# Patient Record
Sex: FEMALE | Race: WHITE | NOT HISPANIC OR LATINO | Employment: OTHER | URBAN - METROPOLITAN AREA
[De-identification: names, ages, dates, MRNs, and addresses within clinical notes are randomized per-mention and may not be internally consistent; named-entity substitution may affect disease eponyms.]

---

## 2017-10-06 ENCOUNTER — TRANSCRIBE ORDERS (OUTPATIENT)
Dept: ADMINISTRATIVE | Facility: HOSPITAL | Age: 62
End: 2017-10-06

## 2017-10-06 DIAGNOSIS — Z12.39 SCREENING BREAST EXAMINATION: Primary | ICD-10-CM

## 2017-10-12 ENCOUNTER — TRANSCRIBE ORDERS (OUTPATIENT)
Dept: ADMINISTRATIVE | Facility: HOSPITAL | Age: 62
End: 2017-10-12

## 2017-10-12 DIAGNOSIS — IMO0001 THE CHANGE: Primary | ICD-10-CM

## 2017-10-18 ENCOUNTER — APPOINTMENT (OUTPATIENT)
Dept: BONE DENSITY | Facility: HOSPITAL | Age: 62
End: 2017-10-18

## 2017-10-18 ENCOUNTER — HOSPITAL ENCOUNTER (OUTPATIENT)
Dept: MAMMOGRAPHY | Facility: HOSPITAL | Age: 62
Discharge: HOME | End: 2017-10-18
Admitting: FAMILY MEDICINE

## 2017-10-18 DIAGNOSIS — IMO0001 THE CHANGE: ICD-10-CM

## 2017-10-18 DIAGNOSIS — Z12.39 SCREENING BREAST EXAMINATION: ICD-10-CM

## 2017-10-18 PROCEDURE — 77063 BREAST TOMOSYNTHESIS BI: CPT

## 2017-10-18 PROCEDURE — G0202 SCR MAMMO BI INCL CAD: HCPCS

## 2017-10-18 PROCEDURE — 77080 DXA BONE DENSITY AXIAL: CPT

## 2018-05-10 ENCOUNTER — ANESTHESIA EVENT (OUTPATIENT)
Dept: PERIOP | Facility: HOSPITAL | Age: 63
End: 2018-05-10

## 2018-05-10 RX ORDER — HYDROCODONE BITARTRATE AND ACETAMINOPHEN 10; 325 MG/1; MG/1
1 TABLET ORAL EVERY 6 HOURS PRN
COMMUNITY

## 2018-05-10 RX ORDER — LEVOTHYROXINE SODIUM 112 UG/1
112 TABLET ORAL DAILY
COMMUNITY

## 2018-05-10 RX ORDER — CYCLOBENZAPRINE HCL 10 MG
10 TABLET ORAL 3 TIMES DAILY PRN
COMMUNITY
End: 2021-07-07

## 2018-05-10 RX ORDER — PSEUDOEPHEDRINE HCL 30 MG
30 TABLET ORAL DAILY
COMMUNITY

## 2018-05-10 RX ORDER — MELOXICAM 15 MG/1
15 TABLET ORAL DAILY
COMMUNITY
End: 2021-07-07

## 2018-05-10 RX ORDER — GABAPENTIN 600 MG/1
600 TABLET ORAL 2 TIMES DAILY
COMMUNITY

## 2018-05-11 ENCOUNTER — HOSPITAL ENCOUNTER (OUTPATIENT)
Facility: HOSPITAL | Age: 63
Setting detail: HOSPITAL OUTPATIENT SURGERY
Discharge: HOME | End: 2018-05-11
Attending: INTERNAL MEDICINE | Admitting: INTERNAL MEDICINE

## 2018-05-11 ENCOUNTER — ON CAMPUS - OUTPATIENT (OUTPATIENT)
Dept: URBAN - METROPOLITAN AREA HOSPITAL 28 | Facility: HOSPITAL | Age: 63
End: 2018-05-11
Payer: MEDICARE

## 2018-05-11 ENCOUNTER — PREP FOR SURGERY (OUTPATIENT)
Dept: OTHER | Facility: HOSPITAL | Age: 63
End: 2018-05-11

## 2018-05-11 ENCOUNTER — ANESTHESIA (OUTPATIENT)
Dept: PERIOP | Facility: HOSPITAL | Age: 63
End: 2018-05-11

## 2018-05-11 VITALS
WEIGHT: 149.8 LBS | BODY MASS INDEX: 24.08 KG/M2 | HEART RATE: 75 BPM | TEMPERATURE: 98.1 F | DIASTOLIC BLOOD PRESSURE: 72 MMHG | OXYGEN SATURATION: 97 % | HEIGHT: 66 IN | RESPIRATION RATE: 16 BRPM | SYSTOLIC BLOOD PRESSURE: 112 MMHG

## 2018-05-11 DIAGNOSIS — K63.89 OTHER SPECIFIED DISEASES OF INTESTINE: ICD-10-CM

## 2018-05-11 DIAGNOSIS — K57.30 DIVERTICULOSIS OF LARGE INTESTINE WITHOUT PERFORATION OR ABS: ICD-10-CM

## 2018-05-11 DIAGNOSIS — Z12.11 ENCOUNTER FOR SCREENING FOR MALIGNANT NEOPLASM OF COLON: ICD-10-CM

## 2018-05-11 DIAGNOSIS — D12.2 BENIGN NEOPLASM OF ASCENDING COLON: ICD-10-CM

## 2018-05-11 DIAGNOSIS — D12.5 BENIGN NEOPLASM OF SIGMOID COLON: ICD-10-CM

## 2018-05-11 DIAGNOSIS — Z12.11 SCREENING FOR COLON CANCER: ICD-10-CM

## 2018-05-11 DIAGNOSIS — D12.3 BENIGN NEOPLASM OF TRANSVERSE COLON: ICD-10-CM

## 2018-05-11 PROCEDURE — 25010000002 PROPOFOL 10 MG/ML EMULSION: Performed by: NURSE ANESTHETIST, CERTIFIED REGISTERED

## 2018-05-11 PROCEDURE — 45380 COLONOSCOPY AND BIOPSY: CPT | Mod: PT

## 2018-05-11 RX ORDER — SODIUM CHLORIDE, SODIUM LACTATE, POTASSIUM CHLORIDE, CALCIUM CHLORIDE 600; 310; 30; 20 MG/100ML; MG/100ML; MG/100ML; MG/100ML
9 INJECTION, SOLUTION INTRAVENOUS CONTINUOUS
Status: DISCONTINUED | OUTPATIENT
Start: 2018-05-11 | End: 2018-05-11 | Stop reason: HOSPADM

## 2018-05-11 RX ORDER — MAGNESIUM HYDROXIDE 1200 MG/15ML
LIQUID ORAL AS NEEDED
Status: DISCONTINUED | OUTPATIENT
Start: 2018-05-11 | End: 2018-05-11 | Stop reason: HOSPADM

## 2018-05-11 RX ORDER — LIDOCAINE HYDROCHLORIDE 10 MG/ML
0.5 INJECTION, SOLUTION EPIDURAL; INFILTRATION; INTRACAUDAL; PERINEURAL ONCE AS NEEDED
Status: COMPLETED | OUTPATIENT
Start: 2018-05-11 | End: 2018-05-11

## 2018-05-11 RX ORDER — SODIUM CHLORIDE 9 MG/ML
40 INJECTION, SOLUTION INTRAVENOUS AS NEEDED
Status: DISCONTINUED | OUTPATIENT
Start: 2018-05-11 | End: 2018-05-11 | Stop reason: HOSPADM

## 2018-05-11 RX ORDER — SODIUM CHLORIDE 0.9 % (FLUSH) 0.9 %
1-10 SYRINGE (ML) INJECTION AS NEEDED
Status: DISCONTINUED | OUTPATIENT
Start: 2018-05-11 | End: 2018-05-11 | Stop reason: HOSPADM

## 2018-05-11 RX ORDER — PROPOFOL 10 MG/ML
VIAL (ML) INTRAVENOUS AS NEEDED
Status: DISCONTINUED | OUTPATIENT
Start: 2018-05-11 | End: 2018-05-11 | Stop reason: SURG

## 2018-05-11 RX ADMIN — PROPOFOL 50 MG: 10 INJECTION, EMULSION INTRAVENOUS at 13:46

## 2018-05-11 RX ADMIN — PROPOFOL 100 MG: 10 INJECTION, EMULSION INTRAVENOUS at 13:30

## 2018-05-11 RX ADMIN — PROPOFOL 20 MG: 10 INJECTION, EMULSION INTRAVENOUS at 13:58

## 2018-05-11 RX ADMIN — PROPOFOL 50 MG: 10 INJECTION, EMULSION INTRAVENOUS at 13:42

## 2018-05-11 RX ADMIN — EPHEDRINE SULFATE 5 MG: 50 INJECTION INTRAMUSCULAR; INTRAVENOUS; SUBCUTANEOUS at 14:02

## 2018-05-11 RX ADMIN — SODIUM CHLORIDE, POTASSIUM CHLORIDE, SODIUM LACTATE AND CALCIUM CHLORIDE 9 ML/HR: 600; 310; 30; 20 INJECTION, SOLUTION INTRAVENOUS at 12:58

## 2018-05-11 RX ADMIN — PROPOFOL 50 MG: 10 INJECTION, EMULSION INTRAVENOUS at 13:38

## 2018-05-11 RX ADMIN — EPHEDRINE SULFATE 5 MG: 50 INJECTION INTRAMUSCULAR; INTRAVENOUS; SUBCUTANEOUS at 13:59

## 2018-05-11 RX ADMIN — LIDOCAINE HYDROCHLORIDE 0.5 ML: 10 INJECTION, SOLUTION EPIDURAL; INFILTRATION; INTRACAUDAL; PERINEURAL at 12:58

## 2018-05-11 RX ADMIN — PROPOFOL 50 MG: 10 INJECTION, EMULSION INTRAVENOUS at 13:54

## 2018-05-11 RX ADMIN — PROPOFOL 50 MG: 10 INJECTION, EMULSION INTRAVENOUS at 13:34

## 2018-05-11 RX ADMIN — PROPOFOL 50 MG: 10 INJECTION, EMULSION INTRAVENOUS at 13:50

## 2018-05-11 NOTE — H&P
Patient Care Team:  Paula Vazquez MD as PCP - General (Family Medicine)    CHIEF COMPLAINT: Screening for CRC    HISTORY OF PRESENT ILLNESS:    Last exam was >10 yr      Past Medical History:   Diagnosis Date   • Chronic back pain    • Disease of thyroid gland    • Hypercholesterolemia    • PONV (postoperative nausea and vomiting)    • Seasonal allergies      Past Surgical History:   Procedure Laterality Date   • APPENDECTOMY     • CERVICAL FUSION  1998    C3   • NECK SURGERY     • RECONSTRUCTION OF NOSE     • SKIN GRAFT       History reviewed. No pertinent family history.  Social History   Substance Use Topics   • Smoking status: Current Every Day Smoker     Packs/day: 1.00     Types: Cigarettes   • Smokeless tobacco: Never Used   • Alcohol use No     Prescriptions Prior to Admission   Medication Sig Dispense Refill Last Dose   • cyclobenzaprine (FLEXERIL) 10 MG tablet Take 10 mg by mouth 3 (Three) Times a Day As Needed for Muscle Spasms.   5/7/2018   • Docusate Calcium (STOOL SOFTENER PO) Take  by mouth Daily.   5/9/2018   • DULoxetine HCl (CYMBALTA PO) Take  by mouth Daily.   5/11/2018   • gabapentin (NEURONTIN) 600 MG tablet Take 600 mg by mouth 2 (Two) Times a Day.   5/11/2018   • HYDROcodone-acetaminophen (NORCO)  MG per tablet Take 1 tablet by mouth Every 6 (Six) Hours As Needed for Moderate Pain .   5/11/2018 at 0530   • levothyroxine (SYNTHROID, LEVOTHROID) 112 MCG tablet Take 112 mcg by mouth Daily.   5/10/2018   • meloxicam (MOBIC) 15 MG tablet Take 15 mg by mouth Daily.   5/6/2018   • pseudoephedrine (SUDAFED) 30 MG tablet Take 30 mg by mouth Daily.   5/8/2018   • Psyllium (METAMUCIL FIBER PO) Take  by mouth Daily.   5/9/2018   • SIMVASTATIN PO Take 1 mg by mouth Daily.   5/10/2018   • TRAZODONE HCL PO Take 1 tablet by mouth Every Night.   5/4/2018     Allergies:  Ampicillin    REVIEW OF SYSTEMS:  Please see the above history of present illness for pertinent positives and negatives.  The remainder  "of the patient's systems have been reviewed and are negative.     Vital Signs  Temp:  [98.6 °F (37 °C)] 98.6 °F (37 °C)  Heart Rate:  [67] 67  Resp:  [12] 12  BP: (124)/(81) 124/81    Flowsheet Rows    Flowsheet Row First Filed Value   Admission Height 167.6 cm (66\") Documented at 05/11/2018 1226   Admission Weight 67.9 kg (149 lb 12.8 oz) Documented at 05/11/2018 1226           Physical Exam:  Physical Exam   Constitutional: Patient appears well-developed and well-nourished and in no acute distress   HEENT:   Head: Normocephalic and atraumatic.   Eyes:  Pupils are equal, round, and reactive to light. EOM are intact. Sclera are anicteric and non-injected.  Mouth and Throat: Patient has moist mucous membranes. Oropharynx is clear of any erythema or exudate.     Neck: Neck supple. No JVD present. No thyromegaly present. No lymphadenopathy present.  Cardiovascular: Regular rate, regular rhythm, S1 normal and S2 normal.  Exam reveals no gallop and no friction rub.  No murmur heard.  Pulmonary/Chest: Lungs are clear to auscultation bilaterally. No respiratory distress. No wheezes. No rhonchi. No rales.   Abdominal: Soft. Bowel sounds are normal. No distension and no mass. There is no hepatosplenomegaly. There is no tenderness.   Musculoskeletal: Normal Muscle tone  Extremities: No edema. Pulses are palpable in all 4 extremities.  Neurological: Patient is alert and oriented to person, place, and time. Cranial nerves II-XII are grossly intact with no focal deficits.  Skin: Skin is warm. No rash noted. Nails show no clubbing.  No cyanosis or erythema.     Results Review:    I reviewed the patient's new clinical results.  Lab Results (most recent)     None          Imaging Results (most recent)     None        reviewed    ECG/EMG Results (most recent)     None        reviewed    Assessment/Plan     Screening for CRC/ Colonoscopy    I discussed the patients findings and my recommendations with patientDarian Boyd " MD Kathia  05/11/18  1:34 PM    Time: 10 min prior to procedure.

## 2018-05-11 NOTE — ANESTHESIA POSTPROCEDURE EVALUATION
Patient: Rosita Houston    Procedure Summary     Date:  05/11/18 Room / Location:  [facility] ENDOSCOPY 1 /  [facility]    Anesthesia Start:  1318 Anesthesia Stop:  1404    Procedure:  COLONOSCOPY (N/A ) Diagnosis:       Colon polyps      Diverticulosis      Melanosis coli      (Z12.11)    Surgeon:  Richard Bae MD Provider:  Savannah Esposito CRNA    Anesthesia Type:  MAC ASA Status:  2          Anesthesia Type: MAC  Last vitals  BP   97/67 (05/11/18 1410)   Temp   98.1 °F (36.7 °C) (05/11/18 1406)   Pulse   68 (05/11/18 1410)   Resp   12 (05/11/18 1410)     SpO2   98 % (05/11/18 1410)     Post Anesthesia Care and Evaluation    Patient location during evaluation: bedside  Patient participation: complete - patient participated  Level of consciousness: awake  Pain score: 0  Airway patency: patent  Anesthetic complications: No anesthetic complications  PONV Status: none  Cardiovascular status: acceptable  Respiratory status: acceptable  Hydration status: acceptable

## 2018-05-11 NOTE — OP NOTE
COLONOSCOPY  Procedure Report    Patient Name:  Rosita Houston  YOB: 1955    Date of Surgery:  5/11/2018     Indications:  Screening for CRC    Pre-op Diagnosis:   Z12.11    Post-Op Diagnosis Codes:     * Colon polyps [K63.5]     * Diverticulosis [K57.90]     * Melanosis coli [K63.89]         Procedure/CPT® Codes:      Procedure(s):  COLONOSCOPY    Staff:  Surgeon(s):  Richard Bae MD         Anesthesia: Monitor Anesthesia Care    Estimated Blood Loss: none    Specimens:   ID Type Source Tests Collected by Time   A : ASCENDING COLON POLYPS X2 Polyp Large Intestine, Right / Ascending Colon TISSUE PATHOLOGY EXAM Richard Bae MD 5/11/2018 1346   B : TRANSVERSE COLON POLYP Polyp Large Intestine, Transverse Colon TISSUE PATHOLOGY EXAM Richard Bae MD 5/11/2018 1352   C : SIGMOID POLYPS X2 Polyp Large Intestine, Sigmoid Colon TISSUE PATHOLOGY EXAM Richard Bae MD 5/11/2018 1356       Implants:    Nothing was implanted during the procedure      Description of Procedure: DESCRIPTION OF PROCEDURE: After having signed informed consent she was brought to the endoscopy suite, placed in the left lateral decubitus position, and given her IV sedation. Rectal exam revealed swollen anal canal but normal tone and no rectal mass. The scope was introduced in the rectum and advanced under direct visualization past mild melanosis coli and small diverticulum in the sigmoid colon. The scope was advanced through the descending colon, to and around the splenic flexure, reduced and advanced through the transverse colon with some looping. The scope was reduced using abdominal splinting. The scope was advanced to and around the hepatic flexure through the ascending colon into the cecum. The cecum was identified by the appendiceal orifice and the ileocecal valve. It was well-prepped and normal-appearing. The ileocecal valve was identified but not significantly intubated. As the  scope was withdrawn there were 2 ascending colon polyps that ranged from 5 to 7 mm; they were both biopsied, felt to be completely removed, and sent together as ascending colon polyps x2. The scope was withdrawn around the hepatic flexure through the transverse colon. There was a sessile 5 mm polyp noted in the transverse colon. This was biopsied and sent separately. The scope was withdrawn around the splenic flexure through the descending colon and sigmoid colon. Within the sigmoid colon there were 2 sessile polyps noted; they ranged in size from 4 to 6 mm and both removed by cold biopsy forceps and sent together as sigmoid colon polyps x2. The scope was withdrawn in the rectum and retroflexed, revealing an intact dentate line and no mucosal lesions. The scope was de-retroflexed and withdrawn. The patient tolerated the procedure very well.          Findings:Colon to Cecum Good Prep  Mild Melanosis  Diverticulosis  Polyps (5)  Cold Biopsy       Complications: none    Recommendations: Repeat in 3 years, results to be called      Richard Bae MD     Date: 5/11/2018  Time: 2:14 PM

## 2018-05-11 NOTE — BRIEF OP NOTE
COLONOSCOPY  Progress Note    Rosita Houston  5/11/2018    Pre-op Diagnosis:   Z12.11       Post-Op Diagnosis Codes:     * Colon polyps [K63.5]     * Diverticulosis [K57.90]     * Melanosis coli [K63.89]    Procedure/CPT® Codes:      Procedure(s):  COLONOSCOPY    Surgeon(s):  Richard Bae MD    Anesthesia: Monitor Anesthesia Care    Staff:   Circulator: Katie Wang RN  Scrub Person: Rach Jones    Estimated Blood Loss: none    Urine Voided: * No values recorded between 5/11/2018  1:20 PM and 5/11/2018  2:05 PM *    Specimens:                ID Type Source Tests Collected by Time   A : ASCENDING COLON POLYPS X2 Polyp Large Intestine, Right / Ascending Colon TISSUE PATHOLOGY EXAM Richard Bae MD 5/11/2018 1346   B : TRANSVERSE COLON POLYP Polyp Large Intestine, Transverse Colon TISSUE PATHOLOGY EXAM Richard Bae MD 5/11/2018 1352   C : SIGMOID POLYPS X2 Polyp Large Intestine, Sigmoid Colon TISSUE PATHOLOGY EXAM Richard Bae MD 5/11/2018 1356         Drains:      Findings: Colon to Cecum Good Prep  Mild Melanosis  Diverticulosis  Polyps (5)  Cold Biopsy    Complications: none      Richard Bae MD     Date: 5/11/2018  Time: 2:09 PM

## 2018-05-11 NOTE — ANESTHESIA PREPROCEDURE EVALUATION
Anesthesia Evaluation     Patient summary reviewed and Nursing notes reviewed   history of anesthetic complications (as a child): PONV  NPO Solid Status: > 8 hours  NPO Liquid Status: > 4 hours           Airway   Mallampati: IV  TM distance: >3 FB  Neck ROM: limited  Possible difficult intubation  Comment: UNABLE TO PROTUDE TONGUE DUE TO FRENULUM  Dental    (+) upper dentures    Pulmonary - normal exam   (+) a smoker (1 PPD/40 YRS) Current Smoked day of surgery, rhonchi (SCATTERED RHONCHI ON RT/ CLEARS W COUGH),   Cardiovascular - normal exam    (+) hyperlipidemia,       Neuro/Psych  (+) psychiatric history Anxiety and Depression,     GI/Hepatic/Renal/Endo    (+)   hypothyroidism,     Musculoskeletal     (+) back pain (LUMBAR),   Abdominal    Substance History   (+) alcohol use (RARE),   Drug use: MJ UNTIL 3 MOS AGO.     OB/GYN          Other                      Anesthesia Plan    ASA 2     MAC     intravenous induction   Anesthetic plan and risks discussed with patient.  Use of blood products discussed with patient  Consented to blood products.

## 2018-05-16 LAB
LAB AP CASE REPORT: NORMAL
LAB AP CLINICAL INFORMATION: NORMAL
Lab: NORMAL
PATH REPORT.FINAL DX SPEC: NORMAL

## 2019-01-21 ENCOUNTER — TRANSCRIBE ORDERS (OUTPATIENT)
Dept: ADMINISTRATIVE | Facility: HOSPITAL | Age: 64
End: 2019-01-21

## 2019-01-21 DIAGNOSIS — R10.9 ABDOMINAL PAIN, UNSPECIFIED ABDOMINAL LOCATION: Primary | ICD-10-CM

## 2019-01-25 ENCOUNTER — HOSPITAL ENCOUNTER (OUTPATIENT)
Dept: CT IMAGING | Facility: HOSPITAL | Age: 64
End: 2019-01-25

## 2019-01-25 ENCOUNTER — HOSPITAL ENCOUNTER (OUTPATIENT)
Dept: CT IMAGING | Facility: HOSPITAL | Age: 64
Discharge: HOME | End: 2019-01-25
Admitting: FAMILY MEDICINE

## 2019-01-25 DIAGNOSIS — R10.9 ABDOMINAL PAIN, UNSPECIFIED ABDOMINAL LOCATION: ICD-10-CM

## 2019-01-25 PROCEDURE — 74177 CT ABD & PELVIS W/CONTRAST: CPT

## 2019-01-25 PROCEDURE — 0 DIATRIZOATE MEGLUMINE & SODIUM PER 1 ML: Performed by: FAMILY MEDICINE

## 2019-01-25 PROCEDURE — 0 IOPAMIDOL PER 1 ML: Performed by: FAMILY MEDICINE

## 2019-01-25 RX ADMIN — DIATRIZOATE MEGLUMINE AND DIATRIZOATE SODIUM 30 ML: 600; 100 SOLUTION ORAL; RECTAL at 09:00

## 2019-01-25 RX ADMIN — IOPAMIDOL 100 ML: 755 INJECTION, SOLUTION INTRAVENOUS at 10:23

## 2019-03-27 ENCOUNTER — TRANSCRIBE ORDERS (OUTPATIENT)
Dept: ADMINISTRATIVE | Facility: HOSPITAL | Age: 64
End: 2019-03-27

## 2019-03-27 DIAGNOSIS — Z12.39 SCREENING BREAST EXAMINATION: Primary | ICD-10-CM

## 2019-04-05 ENCOUNTER — HOSPITAL ENCOUNTER (OUTPATIENT)
Dept: MAMMOGRAPHY | Facility: HOSPITAL | Age: 64
Discharge: HOME | End: 2019-04-05
Admitting: FAMILY MEDICINE

## 2019-04-05 DIAGNOSIS — Z12.39 SCREENING BREAST EXAMINATION: ICD-10-CM

## 2019-04-05 PROCEDURE — 77067 SCR MAMMO BI INCL CAD: CPT

## 2019-04-05 PROCEDURE — 77063 BREAST TOMOSYNTHESIS BI: CPT

## 2020-08-10 ENCOUNTER — TRANSCRIBE ORDERS (OUTPATIENT)
Dept: ADMINISTRATIVE | Facility: HOSPITAL | Age: 65
End: 2020-08-10

## 2020-08-10 DIAGNOSIS — R91.1 PULMONARY NODULE: Primary | ICD-10-CM

## 2020-08-11 ENCOUNTER — HOSPITAL ENCOUNTER (OUTPATIENT)
Dept: CT IMAGING | Facility: HOSPITAL | Age: 65
Discharge: HOME | End: 2020-08-11
Admitting: NURSE PRACTITIONER

## 2020-08-11 DIAGNOSIS — R91.1 PULMONARY NODULE: ICD-10-CM

## 2020-08-11 PROCEDURE — 71250 CT THORAX DX C-: CPT

## 2020-09-15 ENCOUNTER — TRANSCRIBE ORDERS (OUTPATIENT)
Dept: ADMINISTRATIVE | Facility: HOSPITAL | Age: 65
End: 2020-09-15

## 2020-09-15 DIAGNOSIS — R91.1 LUNG NODULE: Primary | ICD-10-CM

## 2021-01-20 ENCOUNTER — TRANSCRIBE ORDERS (OUTPATIENT)
Dept: ADMINISTRATIVE | Facility: HOSPITAL | Age: 66
End: 2021-01-20

## 2021-01-20 DIAGNOSIS — Z12.31 VISIT FOR SCREENING MAMMOGRAM: Primary | ICD-10-CM

## 2021-03-03 ENCOUNTER — APPOINTMENT (OUTPATIENT)
Dept: MAMMOGRAPHY | Facility: HOSPITAL | Age: 66
End: 2021-03-03

## 2021-03-03 ENCOUNTER — APPOINTMENT (OUTPATIENT)
Dept: CT IMAGING | Facility: HOSPITAL | Age: 66
End: 2021-03-03

## 2021-03-04 ENCOUNTER — TELEPHONE (OUTPATIENT)
Dept: GASTROENTEROLOGY | Facility: CLINIC | Age: 66
End: 2021-03-04

## 2021-03-19 ENCOUNTER — PREP FOR SURGERY (OUTPATIENT)
Dept: OTHER | Facility: HOSPITAL | Age: 66
End: 2021-03-19

## 2021-03-19 DIAGNOSIS — K63.89 MELANOSIS COLI: ICD-10-CM

## 2021-03-19 DIAGNOSIS — Z86.010 PERSONAL HISTORY OF COLONIC POLYPS: ICD-10-CM

## 2021-03-19 DIAGNOSIS — Z12.11 ENCOUNTER FOR SCREENING FOR MALIGNANT NEOPLASM OF COLON: Primary | ICD-10-CM

## 2021-03-22 ENCOUNTER — HOSPITAL ENCOUNTER (OUTPATIENT)
Dept: CT IMAGING | Facility: HOSPITAL | Age: 66
Discharge: HOME | End: 2021-03-22

## 2021-03-22 ENCOUNTER — HOSPITAL ENCOUNTER (OUTPATIENT)
Dept: MAMMOGRAPHY | Facility: HOSPITAL | Age: 66
Discharge: HOME | End: 2021-03-22

## 2021-03-22 DIAGNOSIS — Z12.31 VISIT FOR SCREENING MAMMOGRAM: ICD-10-CM

## 2021-03-22 DIAGNOSIS — R91.1 LUNG NODULE: ICD-10-CM

## 2021-03-22 PROCEDURE — 77063 BREAST TOMOSYNTHESIS BI: CPT

## 2021-03-22 PROCEDURE — 71250 CT THORAX DX C-: CPT

## 2021-03-22 PROCEDURE — 77067 SCR MAMMO BI INCL CAD: CPT

## 2021-03-23 PROBLEM — Z86.010 PERSONAL HISTORY OF COLONIC POLYPS: Status: ACTIVE | Noted: 2021-03-23

## 2021-03-23 PROBLEM — Z12.11 ENCOUNTER FOR SCREENING FOR MALIGNANT NEOPLASM OF COLON: Status: ACTIVE | Noted: 2021-03-23

## 2021-03-23 PROBLEM — K63.89 MELANOSIS COLI: Status: ACTIVE | Noted: 2021-03-23

## 2021-03-23 PROBLEM — Z86.0100 PERSONAL HISTORY OF COLONIC POLYPS: Status: ACTIVE | Noted: 2021-03-23

## 2021-03-23 NOTE — TELEPHONE ENCOUNTER
RETURN CALL FROM PATIENT.  SCHEDULED AT [facility] ON 07/08/2021 AT 1PM - ARRIVE 12PM.  WILL MAIL INSTRUCTIONS.    COVID TEST ON 07/06/2021, WILL CALL WITH TIME.  NEED TO SELF QUARANTINE UNTIL AFTER PROCEDURE.  SHE UNDERSTANDS.

## 2021-03-25 ENCOUNTER — TRANSCRIBE ORDERS (OUTPATIENT)
Dept: ADMINISTRATIVE | Facility: HOSPITAL | Age: 66
End: 2021-03-25

## 2021-03-25 DIAGNOSIS — R91.8 LUNG NODULES: Primary | ICD-10-CM

## 2021-07-07 ENCOUNTER — ANESTHESIA EVENT (OUTPATIENT)
Dept: PERIOP | Facility: HOSPITAL | Age: 66
End: 2021-07-07

## 2021-07-07 RX ORDER — TIZANIDINE 4 MG/1
4 TABLET ORAL NIGHTLY PRN
COMMUNITY

## 2021-07-08 ENCOUNTER — ANESTHESIA (OUTPATIENT)
Dept: PERIOP | Facility: HOSPITAL | Age: 66
End: 2021-07-08

## 2021-07-08 ENCOUNTER — HOSPITAL ENCOUNTER (OUTPATIENT)
Facility: HOSPITAL | Age: 66
Setting detail: HOSPITAL OUTPATIENT SURGERY
Discharge: HOME | End: 2021-07-08
Attending: INTERNAL MEDICINE | Admitting: INTERNAL MEDICINE

## 2021-07-08 VITALS
BODY MASS INDEX: 21.21 KG/M2 | WEIGHT: 131.4 LBS | SYSTOLIC BLOOD PRESSURE: 115 MMHG | TEMPERATURE: 98.6 F | RESPIRATION RATE: 12 BRPM | DIASTOLIC BLOOD PRESSURE: 81 MMHG | OXYGEN SATURATION: 99 % | HEART RATE: 63 BPM

## 2021-07-08 DIAGNOSIS — K63.89 MELANOSIS COLI: ICD-10-CM

## 2021-07-08 DIAGNOSIS — Z12.11 ENCOUNTER FOR SCREENING FOR MALIGNANT NEOPLASM OF COLON: ICD-10-CM

## 2021-07-08 DIAGNOSIS — Z86.010 PERSONAL HISTORY OF COLONIC POLYPS: ICD-10-CM

## 2021-07-08 PROCEDURE — 88305 TISSUE EXAM BY PATHOLOGIST: CPT | Performed by: INTERNAL MEDICINE

## 2021-07-08 PROCEDURE — 25010000002 PROPOFOL 10 MG/ML EMULSION: Performed by: NURSE ANESTHETIST, CERTIFIED REGISTERED

## 2021-07-08 PROCEDURE — 45380 COLONOSCOPY AND BIOPSY: CPT | Performed by: INTERNAL MEDICINE

## 2021-07-08 RX ORDER — LIDOCAINE HYDROCHLORIDE 20 MG/ML
INJECTION, SOLUTION INFILTRATION; PERINEURAL AS NEEDED
Status: DISCONTINUED | OUTPATIENT
Start: 2021-07-08 | End: 2021-07-08 | Stop reason: SURG

## 2021-07-08 RX ORDER — PROPOFOL 10 MG/ML
VIAL (ML) INTRAVENOUS AS NEEDED
Status: DISCONTINUED | OUTPATIENT
Start: 2021-07-08 | End: 2021-07-08 | Stop reason: SURG

## 2021-07-08 RX ORDER — MAGNESIUM HYDROXIDE 1200 MG/15ML
LIQUID ORAL AS NEEDED
Status: DISCONTINUED | OUTPATIENT
Start: 2021-07-08 | End: 2021-07-08 | Stop reason: HOSPADM

## 2021-07-08 RX ORDER — SODIUM CHLORIDE, SODIUM LACTATE, POTASSIUM CHLORIDE, CALCIUM CHLORIDE 600; 310; 30; 20 MG/100ML; MG/100ML; MG/100ML; MG/100ML
9 INJECTION, SOLUTION INTRAVENOUS CONTINUOUS
Status: DISCONTINUED | OUTPATIENT
Start: 2021-07-08 | End: 2021-07-08 | Stop reason: HOSPADM

## 2021-07-08 RX ORDER — LIDOCAINE HYDROCHLORIDE 10 MG/ML
0.5 INJECTION, SOLUTION EPIDURAL; INFILTRATION; INTRACAUDAL; PERINEURAL ONCE AS NEEDED
Status: DISCONTINUED | OUTPATIENT
Start: 2021-07-08 | End: 2021-07-08 | Stop reason: HOSPADM

## 2021-07-08 RX ORDER — SODIUM CHLORIDE 0.9 % (FLUSH) 0.9 %
10 SYRINGE (ML) INJECTION AS NEEDED
Status: DISCONTINUED | OUTPATIENT
Start: 2021-07-08 | End: 2021-07-08 | Stop reason: HOSPADM

## 2021-07-08 RX ORDER — SODIUM CHLORIDE 0.9 % (FLUSH) 0.9 %
10 SYRINGE (ML) INJECTION EVERY 12 HOURS SCHEDULED
Status: DISCONTINUED | OUTPATIENT
Start: 2021-07-08 | End: 2021-07-08 | Stop reason: HOSPADM

## 2021-07-08 RX ORDER — SODIUM CHLORIDE 9 MG/ML
40 INJECTION, SOLUTION INTRAVENOUS AS NEEDED
Status: DISCONTINUED | OUTPATIENT
Start: 2021-07-08 | End: 2021-07-08 | Stop reason: HOSPADM

## 2021-07-08 RX ORDER — SODIUM CHLORIDE, SODIUM LACTATE, POTASSIUM CHLORIDE, CALCIUM CHLORIDE 600; 310; 30; 20 MG/100ML; MG/100ML; MG/100ML; MG/100ML
100 INJECTION, SOLUTION INTRAVENOUS CONTINUOUS
Status: DISCONTINUED | OUTPATIENT
Start: 2021-07-08 | End: 2021-07-08 | Stop reason: HOSPADM

## 2021-07-08 RX ADMIN — PROPOFOL 100 MG: 10 INJECTION, EMULSION INTRAVENOUS at 13:15

## 2021-07-08 RX ADMIN — PROPOFOL 50 MG: 10 INJECTION, EMULSION INTRAVENOUS at 13:34

## 2021-07-08 RX ADMIN — PROPOFOL 50 MG: 10 INJECTION, EMULSION INTRAVENOUS at 13:42

## 2021-07-08 RX ADMIN — PROPOFOL 50 MG: 10 INJECTION, EMULSION INTRAVENOUS at 13:21

## 2021-07-08 RX ADMIN — PROPOFOL 50 MG: 10 INJECTION, EMULSION INTRAVENOUS at 13:28

## 2021-07-08 RX ADMIN — LIDOCAINE HYDROCHLORIDE 100 MG: 20 INJECTION, SOLUTION INFILTRATION; PERINEURAL at 13:15

## 2021-07-08 RX ADMIN — SODIUM CHLORIDE, POTASSIUM CHLORIDE, SODIUM LACTATE AND CALCIUM CHLORIDE 9 ML/HR: 600; 310; 30; 20 INJECTION, SOLUTION INTRAVENOUS at 12:35

## 2021-07-08 NOTE — ANESTHESIA PREPROCEDURE EVALUATION
" Anesthesia Evaluation     Patient summary reviewed and Nursing notes reviewed   history of anesthetic complications: PONV  NPO Solid Status: > 8 hours  NPO Liquid Status: > 8 hours           Airway   Mallampati: II  TM distance: >3 FB  Neck ROM: full  No difficulty expected  Dental    (+) upper dentures and poor dentition        Pulmonary    (+) a smoker Current Smoked day of surgery, decreased breath sounds,   (-) shortness of breath  Cardiovascular - normal exam    ECG reviewed    (+) hyperlipidemia,   (-) angina      Neuro/Psych- negative ROS  GI/Hepatic/Renal/Endo    (+)   thyroid problem hypothyroidism    Musculoskeletal (-) negative ROS    Abdominal  - normal exam   Substance History   (+) alcohol use (2x a year), drug use (MJ qid )     OB/GYN negative ob/gyn ROS         Other                      Anesthesia Plan    ASA 3     MAC   (I explained to the patient the RBA to the anesthetic and the potential to be intubated in an emergency. Pt requests not to be intubated said \"I would rather die.\" Tried to explain that intubation is not a normal part of the procedure and only part of an emergency response but she was very vocal about not wanting to be intubated under any circumstances  )  intravenous induction     Anesthetic plan, all risks, benefits, and alternatives have been provided, discussed and informed consent has been obtained with: patient.  Use of blood products discussed with patient  Consented to blood products.     "

## 2021-07-08 NOTE — BRIEF OP NOTE
COLONOSCOPY WITH POLYPECTOMY  Progress Note    Rosita Houston  7/8/2021    Pre-op Diagnosis:   Encounter for screening for malignant neoplasm of colon [Z12.11]  Personal history of colonic polyps [Z86.010]  Melanosis coli [K63.89]       Post-Op Diagnosis Codes:     * Encounter for screening for malignant neoplasm of colon [Z12.11]     * Personal history of colonic polyps [Z86.010]     * Melanosis coli [K63.89]     * Internal and external hemorrhoids without complication [K64.4, K64.8]     * Diverticulosis [K57.90]     * Colon polyp [K63.5]    Procedure/CPT® Codes:        Procedure(s):  COLONOSCOPY WITH POLYPECTOMY    Surgeon(s):  Richard Bae MD    Anesthesia: Monitored Anesthesia Care    Staff:   Circulator: Faith Johnson RN  Scrub Person: Laurita Loving  [place]: Jeannette Palacios RN         Estimated Blood Loss: none    Urine Voided: * No values recorded between 7/8/2021  1:09 PM and 7/8/2021  1:44 PM *    Specimens:                Specimens     ID Source Type Tests Collected By Collected At Frozen?    A Large Intestine, Transverse Colon Polyp · TISSUE PATHOLOGY EXAM   Richard Bae MD 7/8/21 1334     Description: x2    This specimen was not marked as sent.    B Large Intestine, Sigmoid Colon Polyp · TISSUE PATHOLOGY EXAM   Richard Bae MD 7/8/21 1341     This specimen was not marked as sent.                Drains: * No LDAs found *    Findings: Colon to TI good Prep  Sigmoid Diverticulosis  Polyps-3-Biopsy  Int/Ext Hemorrhoids    Complications: none          Richard Bae MD     Date: 7/8/2021  Time: 13:46 EDT

## 2021-07-08 NOTE — H&P
Patient Care Team:  Akhil Dillon MD as PCP - General (Family Medicine)    CHIEF COMPLAINT: Personal hx colon polyps    HISTORY OF PRESENT ILLNESS:  Last exam was 2018    Past Medical History:   Diagnosis Date   • Chronic back pain    • Disease of thyroid gland    • Hypercholesterolemia    • Marijuana smoker     3X'S /DAY   • PONV (postoperative nausea and vomiting)    • Seasonal allergies      Past Surgical History:   Procedure Laterality Date   • APPENDECTOMY     • CERVICAL FUSION  1998    C3   • COLONOSCOPY N/A 5/11/2018    Procedure: COLONOSCOPY;  Surgeon: Richard Bae MD;  Location: [facility];  Service: Gastroenterology   • NECK SURGERY     • RECONSTRUCTION OF NOSE     • SKIN GRAFT      x 30     Family History   Problem Relation Age of Onset   • Breast cancer Neg Hx      Social History     Tobacco Use   • Smoking status: Current Every Day Smoker     Packs/day: 1.00     Types: Cigarettes   • Smokeless tobacco: Never Used   • Tobacco comment: pt smoked on way to procedure   Substance Use Topics   • Alcohol use: No   • Drug use: Yes     Types: Marijuana     Medications Prior to Admission   Medication Sig Dispense Refill Last Dose   • pseudoephedrine (SUDAFED) 30 MG tablet Take 30 mg by mouth Daily.   Past Month at Unknown time   • tiZANidine (ZANAFLEX) 4 MG tablet Take 4 mg by mouth At Night As Needed for Muscle Spasms.   Past Week at Unknown time   • Docusate Calcium (STOOL SOFTENER PO) Take  by mouth Daily.   7/7/2021 at 2100   • DULoxetine HCl (CYMBALTA PO) Take  by mouth Daily.   7/8/2021 at 0700   • gabapentin (NEURONTIN) 600 MG tablet Take 600 mg by mouth 2 (Two) Times a Day.   7/8/2021 at 0700   • HYDROcodone-acetaminophen (NORCO)  MG per tablet Take 1 tablet by mouth Every 6 (Six) Hours As Needed for Moderate Pain .   7/8/2021 at 0700   • levothyroxine (SYNTHROID, LEVOTHROID) 112 MCG tablet Take 112 mcg by mouth Daily.   7/7/2021 at 2100   • Psyllium (METAMUCIL FIBER PO) Take  by  mouth Daily.      • SIMVASTATIN PO Take 1 mg by mouth Daily.   7/7/2021 at 2000   • TRAZODONE HCL PO Take 1 tablet by mouth Every Night.   7/4/2021 at 2200     Allergies:  Ampicillin    REVIEW OF SYSTEMS:  Please see the above history of present illness for pertinent positives and negatives.  The remainder of the patient's systems have been reviewed and are negative.     Vital Signs  Temp:  [98.7 °F (37.1 °C)] 98.7 °F (37.1 °C)  Heart Rate:  [57] 57  Resp:  [12] 12  BP: ()/(49-82) 123/82    Flowsheet Rows      First Filed Value   Admission Height  --   Admission Weight  59.6 kg (131 lb 6.4 oz) Documented at 07/08/2021 1207           Physical Exam:  Physical Exam   Constitutional: Patient appears well-developed and well-nourished and in no acute distress   HEENT:   Head: Normocephalic and atraumatic.   Eyes:  Pupils are equal, round, and reactive to light. EOM are intact. Sclerae are anicteric and non-injected.  Mouth and Throat: Patient has moist mucous membranes. Oropharynx is clear of any erythema or exudate.     Neck: Neck supple. No JVD present. No thyromegaly present. No lymphadenopathy present.  Cardiovascular: Regular rate, regular rhythm, S1 normal and S2 normal.  Exam reveals no gallop and no friction rub.  No murmur heard.  Pulmonary/Chest: Lungs are clear to auscultation bilaterally. No respiratory distress. No wheezes. No rhonchi. No rales.   Abdominal: Soft. Bowel sounds are normal. No distension and no mass. There is no hepatosplenomegaly. There is no tenderness.   Musculoskeletal: Normal Muscle tone  Extremities: No edema. Pulses are palpable in all 4 extremities.  Neurological: Patient is alert and oriented to person, place, and time. Cranial nerves II-XII are grossly intact with no focal deficits.  Skin: Skin is warm. No rash noted. Nails show no clubbing.  No cyanosis or erythema.    Debilities/Disabilities Identified: None  Emotional Behavior: Appropriate     Results Review:   I reviewed the  patient's new clinical results.    Lab Results (most recent)     None          Imaging Results (Most Recent)     None        reviewed    ECG/EMG Results (most recent)     None        reviewed    Assessment/Plan   Personal hx colon polyps/  colonoscopy      I discussed the patient's findings and my recommendations with patient.     Richard Bae MD  07/08/21  12:44 EDT    Time: 10 min prior to procedure.

## 2021-07-08 NOTE — OP NOTE
COLONOSCOPY WITH POLYPECTOMY  Procedure Report    Patient Name:  Rosita Houston  YOB: 1955    Date of Surgery:  7/8/2021     Indications:  Encounter for screening for malignant neoplasm of colon [Z12.11]  Personal history of colonic polyps [Z86.010]  Melanosis coli [K63.89]      Pre-op Diagnosis:   Encounter for screening for malignant neoplasm of colon [Z12.11]  Personal history of colonic polyps [Z86.010]  Melanosis coli [K63.89]    Post-Op Diagnosis Codes:     * Encounter for screening for malignant neoplasm of colon [Z12.11]     * Personal history of colonic polyps [Z86.010]     * Melanosis coli [K63.89]     * Internal and external hemorrhoids without complication [K64.4, K64.8]     * Diverticulosis [K57.90]     * Colon polyp [K63.5]         Procedure/CPT® Codes:      Procedure(s):  COLONOSCOPY WITH POLYPECTOMY    Staff:  Surgeon(s):  Richard Bae MD         Anesthesia: Monitored Anesthesia Care    Estimated Blood Loss: none    Specimens:   ID Type Source Tests Collected by Time   A (Not marked as sent) : x2 Polyp Large Intestine, Transverse Colon TISSUE PATHOLOGY EXAM Richard Bae MD 7/8/2021 1334   B (Not marked as sent) :  Polyp Large Intestine, Sigmoid Colon TISSUE PATHOLOGY EXAM Richard Bae MD 7/8/2021 1341       Implants:    Nothing was implanted during the procedure      Description of Procedure: After having signed informed consent, she was brought to the endoscopy suite, placed in left lateral decubitus position and given her IV sedation. Rectal exam revealed a yi of external hemorrhoids and swollen anal canal, but normal tone. No rectal mass. The scope was introduced in the rectum, rectum was distended. The scope was retroflexed. There were swollen internal hemorrhoidal veins as well. No mucosal lesions. The scope was deretroflexed and advanced from the rectum, through the sigmoid colon, passed multiple diverticular openings to the  descending colon, to and around the splenic flexure; reduced; advanced through the transverse colon fairly easily to and around the hepatic flexure. The scope was reduced in the ascending colon, wound not advance easily pass the ileocecal valve. The scope was reduced. Variable resistance was increased and with abdominal splinting, the scope could be advanced into the cecum. The cecum was identified by the appendiceal orifice and the ileocecal valve. The cecum was fairly well prepped. It was flushed and cleared, normal appearing. The ileocecal valve was intubated. The terminal ileum was normal-appearing. The scope was withdrawn back in the ascending colon, withdrawn slowly; examining the colon in a circumferential fashion. There were no mucosal lesions noted throughout the cecum, ascending colon or hepatic flexure. In the transverse colon, there were 2 sessile polyps noted. They ranged in size from 4 to 6 mm. They were both biopsied and sent together as transverse colon polyps x2. The scope was then withdrawn around the splenic flexure, through the descending colon and sigmoid colon. There were diverticulum that were limited to the sigmoid colon. The scope was then withdrawn. In the distal sigmoid colon was a sessile 4 x 6 mm polyp that was biopsied, felt to be completley removed, sent separately as a sigmoid colon polyp. The scope was withdrawn back into the rectum, mucosa there was normal appearing. Again, dilated hemorrhoidal veins were noted. The scope was taken from the patient. She tolerated the procedure very well.                 Findings: Colon to TI good Prep  Sigmoid Diverticulosis  Polyps-3-Biopsy  Int/Ext Hemorrhoids         Complications: None    Recommendations: Results to be called.      Richard Bae MD     Date: 7/8/2021  Time: 13:48 EDT

## 2021-07-08 NOTE — ANESTHESIA POSTPROCEDURE EVALUATION
Patient: Rosita Houston    Procedure Summary     Date: 07/08/21 Room / Location: [facility] ENDOSCOPY 1 /  [facility]    Anesthesia Start: 1309 Anesthesia Stop: 1346    Procedure: COLONOSCOPY WITH POLYPECTOMY (N/A ) Diagnosis:       Encounter for screening for malignant neoplasm of colon      Personal history of colonic polyps      Melanosis coli      Internal and external hemorrhoids without complication      Diverticulosis      Colon polyp      (Encounter for screening for malignant neoplasm of colon [Z12.11])      (Personal history of colonic polyps [Z86.010])      (Melanosis coli [K63.89])    Surgeons: Richard Bae MD Provider: Eduin Stevenson CRNA    Anesthesia Type: MAC ASA Status: 3          Anesthesia Type: MAC    Vitals  Vitals Value Taken Time   /90 07/08/21 1420   Temp 98.6 °F (37 °C) 07/08/21 1351   Pulse 61 07/08/21 1420   Resp 12 07/08/21 1420   SpO2 99 % 07/08/21 1420           Post Anesthesia Care and Evaluation    Patient location during evaluation: PHASE II  Patient participation: complete - patient participated  Level of consciousness: awake and alert  Pain score: 0  Pain management: adequate  Airway patency: patent  Anesthetic complications: No anesthetic complications  PONV Status: none  Cardiovascular status: acceptable  Respiratory status: acceptable  Hydration status: acceptable

## 2021-07-12 LAB
CYTO UR: NORMAL
LAB AP CASE REPORT: NORMAL
PATH REPORT.FINAL DX SPEC: NORMAL
PATH REPORT.GROSS SPEC: NORMAL

## 2021-09-16 ENCOUNTER — HOSPITAL ENCOUNTER (OUTPATIENT)
Dept: CT IMAGING | Facility: HOSPITAL | Age: 66
Discharge: HOME | End: 2021-09-16
Admitting: INTERNAL MEDICINE

## 2021-09-16 DIAGNOSIS — R91.8 LUNG NODULES: ICD-10-CM

## 2021-09-16 PROCEDURE — 71250 CT THORAX DX C-: CPT

## 2021-11-11 ENCOUNTER — TRANSCRIBE ORDERS (OUTPATIENT)
Dept: ADMINISTRATIVE | Facility: HOSPITAL | Age: 66
End: 2021-11-11

## 2021-11-11 DIAGNOSIS — M54.9 DORSALGIA, UNSPECIFIED: Primary | ICD-10-CM

## 2021-11-18 ENCOUNTER — HOSPITAL ENCOUNTER (OUTPATIENT)
Dept: ULTRASOUND IMAGING | Facility: HOSPITAL | Age: 66
Discharge: HOME | End: 2021-11-18
Admitting: NURSE PRACTITIONER

## 2021-11-18 DIAGNOSIS — M54.9 DORSALGIA, UNSPECIFIED: ICD-10-CM

## 2021-11-18 PROCEDURE — 76775 US EXAM ABDO BACK WALL LIM: CPT

## 2021-12-07 ENCOUNTER — TRANSCRIBE ORDERS (OUTPATIENT)
Dept: ADMINISTRATIVE | Facility: HOSPITAL | Age: 66
End: 2021-12-07

## 2021-12-07 DIAGNOSIS — R91.8 LUNG NODULES: Primary | ICD-10-CM

## 2021-12-13 ENCOUNTER — HOSPITAL ENCOUNTER (OUTPATIENT)
Dept: CT IMAGING | Facility: HOSPITAL | Age: 66
End: 2021-12-13

## 2021-12-30 ENCOUNTER — HOSPITAL ENCOUNTER (OUTPATIENT)
Dept: CT IMAGING | Facility: HOSPITAL | Age: 66
Discharge: HOME | End: 2021-12-30
Admitting: INTERNAL MEDICINE

## 2021-12-30 DIAGNOSIS — R91.8 LUNG NODULES: ICD-10-CM

## 2021-12-30 PROCEDURE — 71250 CT THORAX DX C-: CPT

## 2022-01-20 ENCOUNTER — TRANSCRIBE ORDERS (OUTPATIENT)
Dept: ADMINISTRATIVE | Facility: HOSPITAL | Age: 67
End: 2022-01-20

## 2022-01-20 DIAGNOSIS — R91.8 MULTIPLE LUNG NODULES: Primary | ICD-10-CM

## 2022-01-26 ENCOUNTER — HOSPITAL ENCOUNTER (OUTPATIENT)
Dept: PET IMAGING | Facility: HOSPITAL | Age: 67
Discharge: HOME | End: 2022-01-26

## 2022-01-26 DIAGNOSIS — R91.8 MULTIPLE LUNG NODULES: ICD-10-CM

## 2022-01-26 LAB — GLUCOSE BLDC GLUCOMTR-MCNC: 107 MG/DL (ref 70–130)

## 2022-01-26 PROCEDURE — 78815 PET IMAGE W/CT SKULL-THIGH: CPT

## 2022-01-26 PROCEDURE — 0 FLUDEOXYGLUCOSE F18 SOLUTION: Performed by: INTERNAL MEDICINE

## 2022-01-26 PROCEDURE — A9552 F18 FDG: HCPCS | Performed by: INTERNAL MEDICINE

## 2022-01-26 PROCEDURE — 82962 GLUCOSE BLOOD TEST: CPT

## 2022-01-26 RX ADMIN — FLUDEOXYGLUCOSE F18 1 DOSE: 300 INJECTION INTRAVENOUS at 09:32

## 2022-01-31 ENCOUNTER — PREP FOR SURGERY (OUTPATIENT)
Dept: OTHER | Facility: HOSPITAL | Age: 67
End: 2022-01-31

## 2022-01-31 ENCOUNTER — OFFICE VISIT (OUTPATIENT)
Dept: SURGERY | Facility: CLINIC | Age: 67
End: 2022-01-31

## 2022-01-31 VITALS
HEART RATE: 63 BPM | SYSTOLIC BLOOD PRESSURE: 124 MMHG | BODY MASS INDEX: 21.69 KG/M2 | DIASTOLIC BLOOD PRESSURE: 78 MMHG | HEIGHT: 66 IN | OXYGEN SATURATION: 98 % | WEIGHT: 135 LBS

## 2022-01-31 DIAGNOSIS — R91.8 MULTIPLE LUNG NODULES: Primary | ICD-10-CM

## 2022-01-31 DIAGNOSIS — R79.1 ABNORMAL COAGULATION PROFILE: ICD-10-CM

## 2022-01-31 PROCEDURE — 99204 OFFICE O/P NEW MOD 45 MIN: CPT | Performed by: THORACIC SURGERY (CARDIOTHORACIC VASCULAR SURGERY)

## 2022-01-31 RX ORDER — MELOXICAM 15 MG/1
TABLET ORAL
COMMUNITY
Start: 2021-12-09

## 2022-01-31 RX ORDER — CLINDAMYCIN PHOSPHATE 900 MG/50ML
900 INJECTION INTRAVENOUS ONCE
Status: CANCELLED | OUTPATIENT
Start: 2022-02-18 | End: 2022-01-31

## 2022-01-31 RX ORDER — GABAPENTIN 300 MG/1
300 CAPSULE ORAL ONCE
Status: CANCELLED | OUTPATIENT
Start: 2022-02-18 | End: 2022-01-31

## 2022-01-31 RX ORDER — HEPARIN SODIUM 5000 [USP'U]/ML
5000 INJECTION, SOLUTION INTRAVENOUS; SUBCUTANEOUS ONCE
Status: CANCELLED | OUTPATIENT
Start: 2022-02-18 | End: 2022-01-31

## 2022-01-31 RX ORDER — ACETAMINOPHEN 500 MG
1000 TABLET ORAL ONCE
Status: CANCELLED | OUTPATIENT
Start: 2022-02-18 | End: 2022-01-31

## 2022-01-31 RX ORDER — SODIUM CHLORIDE 0.9 % (FLUSH) 0.9 %
3 SYRINGE (ML) INJECTION EVERY 12 HOURS SCHEDULED
Status: CANCELLED | OUTPATIENT
Start: 2022-02-18

## 2022-01-31 RX ORDER — CELECOXIB 200 MG/1
200 CAPSULE ORAL ONCE
Status: CANCELLED | OUTPATIENT
Start: 2022-02-18 | End: 2022-01-31

## 2022-01-31 RX ORDER — SODIUM CHLORIDE 0.9 % (FLUSH) 0.9 %
3-10 SYRINGE (ML) INJECTION AS NEEDED
Status: CANCELLED | OUTPATIENT
Start: 2022-02-18

## 2022-01-31 NOTE — PROGRESS NOTES
"Chief Complaint  Multiple lung nodules    Subjective          Rosita Houston presents to [facility] THORACIC SURGERY  History of Present Illness     Ms. Houston is a 66-year-old  female seen in the office today for evaluation and treatment of multiple cavitary lung nodules which are enlarging.  Patient has a history of chronic low back pain.  In January 2019 she presented to the emergency room with left flank pain.  CT of the abdomen and pelvis failed to show a definite cause for her pain but she was noted to have a noncalcified nodule in the left lower lobe of her lung.  She was referred to pulmonary medicine and has been followed since then with serial CT scans.  These nodules have enlarged and some of them have developed cavities.  She is here now for further evaluation.    Patient has been smoking since age 16.  She smokes at least 1 pack of cigarettes per day and has done so for approximately 50 years.  She has worked as a  but injured her back and has been disabled from her work since then.  She remains fairly active and is able to walk long distances without shortness of breath as long as it is on flat ground.  If she climbs an incline or steps she does develop shortness of breath.  She reports no cough or hemoptysis.  She has no pleuritic pain.  She has no hoarseness or change in her voice.  She has no fever chills or night sweats.  She has lost weight from 145 pounds down to 128 pounds.  However over the last 2 months she has gained back 5-1/2 pounds.  She has no personal history of cancer.  She does have a brother who recently passed away from liver cancer.  Both her mother and her father were heavy smokers.  She has had no significant industrial exposure.    Objective   Vital Signs:   /78 (BP Location: Right arm, Patient Position: Sitting, Cuff Size: Adult)   Pulse 63   Ht 167.6 cm (66\")   Wt 61.2 kg (135 lb)   SpO2 98%   BMI 21.79 kg/m²     Physical " Exam  Constitutional:       Appearance: Normal appearance. She is well-developed.   HENT:      Head: Normocephalic.      Comments: Plastic and reconstructive surgery to her nose and the right side of her face.  Eyes:      General: Lids are normal.      Conjunctiva/sclera: Conjunctivae normal.      Pupils: Pupils are equal, round, and reactive to light.   Neck:      Thyroid: No thyroid mass or thyromegaly.      Vascular: No carotid bruit, hepatojugular reflux or JVD.      Trachea: Trachea normal.      Comments: No masses.  No cervical or supraclavicular adenopathy.  Cardiovascular:      Rate and Rhythm: Normal rate and regular rhythm.  No extrasystoles are present.     Chest Wall: PMI is not displaced.      Pulses: Normal pulses.      Heart sounds: Normal heart sounds, S1 normal and S2 normal.   Pulmonary:      Effort: Pulmonary effort is normal.      Breath sounds: Normal breath sounds.   Chest:      Comments: No axillary adenopathy  Abdominal:      General: Bowel sounds are normal.      Palpations: Abdomen is soft. There is no mass.      Tenderness: There is no abdominal tenderness.      Hernia: No hernia is present.   Musculoskeletal:         General: Normal range of motion.      Cervical back: Normal range of motion and neck supple.   Skin:     General: Skin is warm and dry.   Neurological:      Mental Status: She is alert and oriented to person, place, and time.      Cranial Nerves: No cranial nerve deficit.      Sensory: No sensory deficit.      Deep Tendon Reflexes: Reflexes are normal and symmetric.   Psychiatric:         Speech: Speech normal.         Behavior: Behavior normal.         Thought Content: Thought content normal.         Judgment: Judgment normal.        Result Review :   The following data was reviewed by: Adam Archibald III, MD on 01/31/2022:    CT scan of the chest performed December 30, 2021 was independently reviewed and compared to CT scans dating back to August 2020.  There are  multiple nodules throughout both lungs.  Most prominent in the upper lobes and most likely in the left upper lobe.  Some of these nodules are cavitary.  No suspicious hilar or mediastinal adenopathy.  No pleural effusions.  No pleural-based nodules.  No bony abnormalities.    CT PET scan performed January 26, 2022 was compared to the previous CT scans.  The largest nodule in the apex of the left upper lobe shows the most increase in size and is hypermetabolic with an SUV of 5.2.  There is no hypermetabolic uptake in the hilum or mediastinum.  Neck abdomen and pelvis showed no hypermetabolic activity.         Assessment and Plan    Diagnoses and all orders for this visit:    1. Multiple lung nodules (Primary)  -     Case Request      Discussed with the patient and her sister the significance of these enlarging nodules some of which are cavitary in a smoker with a greater than 50-pack-year history.  Mostly concerned about the possibility of lung cancer.  However these nodules have been present for approximately 3 years showing very slow growth.  It is possible that this could represent some sort of granulomatous disease.  In any event I believe that the next step should be a definitive biopsy.  I have recommended left robot-assisted wedge resection with lymphadenectomy.  I have explained the procedure as well as the risks and benefits.  I have answered all of her questions to her satisfaction.  She wishes to go home and think this over before making a final decision.  I have taken the liberty of placing a case request and preoperative orders in epic should she decide to proceed.  We will await her final decision.  I will keep you informed of her progress.  Thank you for allowing us to participate in the care of Ms. Houston.    I spent 47 minutes caring for Rosita on this date of service. This time includes time spent by me in the following activities:preparing for the visit, reviewing tests, obtaining and/or reviewing a  separately obtained history, performing a medically appropriate examination and/or evaluation , counseling and educating the patient/family/caregiver, ordering medications, tests, or procedures, referring and communicating with other health care professionals , documenting information in the medical record, independently interpreting results and communicating that information with the patient/family/caregiver and care coordination  Follow Up   Return for Patient will call about her decision regarding surgery.  Patient was given instructions and counseling regarding her condition or for health maintenance advice. Please see specific information pulled into the AVS if appropriate.

## 2022-02-03 ENCOUNTER — PATIENT ROUNDING (BHMG ONLY) (OUTPATIENT)
Dept: SURGERY | Facility: CLINIC | Age: 67
End: 2022-02-03

## 2022-02-03 NOTE — PROGRESS NOTES
February 3, 2022    Done in office    Tell me about your visit with us. What things went well?  It was fine, everything went ok.        We're always looking for ways to make our patients' experiences even better. Do you have recommendations on ways we may improve?  no    Overall were you satisfied with your first visit to our practice? yes       I appreciate you taking the time to speak with me today. Is there anything else I can do for you? no      Thank you, and have a great day.

## 2022-02-07 ENCOUNTER — TELEPHONE (OUTPATIENT)
Dept: SURGERY | Facility: CLINIC | Age: 67
End: 2022-02-07

## 2022-02-07 NOTE — TELEPHONE ENCOUNTER
----- Message from Dayna Sykes sent at 2/7/2022 12:16 PM EST -----  Regarding: patient has questions  Patient requested you to call her. Says she has questions regarding the procedure biopsy you are going to do. Call back number is [phone].    Thank you    I talked with Ms. Houston today about her multiple lung nodules and my recommendation for wedge resection with biopsy. She had a lot of questions about recovery management of postoperative pain and treatment options for what ever would be found at the time of biopsy. Patient has had a lot of bad experiences with surgery and hospitalization is having a hard time making a decision about proceeding with his biopsy. She is not sure if she would even take treatment after a diagnosis is established. I have answered all of her questions to her satisfaction. She will consider her options further. She will contact me when she is made a decision about whether or not to proceed.

## 2022-02-22 ENCOUNTER — TELEPHONE (OUTPATIENT)
Dept: SURGERY | Facility: CLINIC | Age: 67
End: 2022-02-22

## 2022-02-22 DIAGNOSIS — R91.8 MULTIPLE LUNG NODULES: Primary | ICD-10-CM

## 2022-02-22 NOTE — TELEPHONE ENCOUNTER
Caller: PATIENT    Relationship: SELF    Best call back number: [phone]    What is the best time to reach you: ANYTIME    Who are you requesting to speak with (clinical staff, provider,  specific staff member): CLINICAL    Do you know the name of the person who called: NISH WALDRON    What was the call regarding:PATIENT CALLED IN AND WOULD LIKE TO SPEAK WITH DR. GEIGER OR NURSE. PATIENT DOES NOT WANT TO GO THROUGH BIOPSY AND WANTS TO TREAT FUNGUS WITHOUT BIOPSY.IF THAT IS AN OPTION AND DISCUSS TREATMENT PAN.    Do you require a callback: YES

## 2022-02-22 NOTE — TELEPHONE ENCOUNTER
Called patient back regarding MyChart message. Patient does not wish to proceed with surgery.     Dr Archibald advised patient to be seen by Infectious Disease to be evaluated and possibly treated but without proceeding with the biopsy we can not fully diagnose the lesions.     I explained all of this to the patient and patient wants to proceed with referral to Infectious Disease.

## 2022-02-28 ENCOUNTER — OFFICE VISIT (OUTPATIENT)
Dept: INFECTIOUS DISEASES | Facility: CLINIC | Age: 67
End: 2022-02-28

## 2022-02-28 ENCOUNTER — LAB (OUTPATIENT)
Dept: LAB | Facility: HOSPITAL | Age: 67
End: 2022-02-28

## 2022-02-28 VITALS
SYSTOLIC BLOOD PRESSURE: 111 MMHG | TEMPERATURE: 98.1 F | RESPIRATION RATE: 18 BRPM | DIASTOLIC BLOOD PRESSURE: 72 MMHG | BODY MASS INDEX: 21.41 KG/M2 | WEIGHT: 133.2 LBS | HEART RATE: 62 BPM | HEIGHT: 66 IN

## 2022-02-28 DIAGNOSIS — R63.4 WEIGHT LOSS: ICD-10-CM

## 2022-02-28 DIAGNOSIS — R06.09 DOE (DYSPNEA ON EXERTION): ICD-10-CM

## 2022-02-28 DIAGNOSIS — J98.4 PULMONARY CAVITARY LESION: Primary | ICD-10-CM

## 2022-02-28 DIAGNOSIS — J98.4 PULMONARY CAVITARY LESION: ICD-10-CM

## 2022-02-28 LAB
CHROMATIN AB SERPL-ACNC: <10 IU/ML (ref 0–14)
CRYPTOC AG CSF QL: NEGATIVE
PROCALCITONIN SERPL-MCNC: 0.02 NG/ML (ref 0–0.25)

## 2022-02-28 PROCEDURE — 86037 ANCA TITER EACH ANTIBODY: CPT

## 2022-02-28 PROCEDURE — 86235 NUCLEAR ANTIGEN ANTIBODY: CPT

## 2022-02-28 PROCEDURE — 86635 COCCIDIOIDES ANTIBODY: CPT

## 2022-02-28 PROCEDURE — 87449 NOS EACH ORGANISM AG IA: CPT

## 2022-02-28 PROCEDURE — 84145 PROCALCITONIN (PCT): CPT

## 2022-02-28 PROCEDURE — 99204 OFFICE O/P NEW MOD 45 MIN: CPT | Performed by: INTERNAL MEDICINE

## 2022-02-28 PROCEDURE — 86480 TB TEST CELL IMMUN MEASURE: CPT

## 2022-02-28 PROCEDURE — 36415 COLL VENOUS BLD VENIPUNCTURE: CPT

## 2022-02-28 PROCEDURE — 87899 AGENT NOS ASSAY W/OPTIC: CPT

## 2022-02-28 PROCEDURE — 86698 HISTOPLASMA ANTIBODY: CPT

## 2022-02-28 PROCEDURE — 87385 HISTOPLASMA CAPSUL AG IA: CPT

## 2022-02-28 PROCEDURE — 87305 ASPERGILLUS AG IA: CPT

## 2022-02-28 PROCEDURE — 86225 DNA ANTIBODY NATIVE: CPT

## 2022-02-28 PROCEDURE — 86431 RHEUMATOID FACTOR QUANT: CPT

## 2022-02-28 RX ORDER — THIAMINE HCL 50 MG
50 TABLET ORAL DAILY
COMMUNITY

## 2022-02-28 RX ORDER — MULTIVIT-MIN/IRON/FOLIC ACID/K 18-600-40
50 CAPSULE ORAL DAILY
COMMUNITY

## 2022-02-28 NOTE — PROGRESS NOTES
Referring Provider: Adam Archibald III, MD  [address]  [address] [address]  Reason for clinic visits: Initial infectious Disease clinic for evaluation of multipel cavitary pulmonary nodules.     HPI: Rosita Houston is a 66 y.o. female who presents to the infectious disease clinic with above complaint.  The patient was found to have pulmonary nodules on CAT scan done in 2019.  CAT scan was done due to flank pain and the pulmonary nodules were an incidental finding.  These have been monitor since that time and are seem to be increasing in size and becoming cavitary.  The patient does have a significant smoking history and continues to smoke therefore due to that in the appearance of the images there is concern for potential malignancy.  She was referred to thoracic surgery and was recommended for biopsy.  The patient is very hesitant to have that done and therefore requested referral to infectious disease to look for potential other sources of her pulmonary nodules.  The patient reports dyspnea on exertion that has been stable for many years.  This mostly manifests when she is walking up the hill.  It does not limit her activities of daily living.  She denies any shortness of breath at rest.  She reports a chronic dry cough with occasional clear sputum production which she attributes to her ongoing smoking.  She states the cough has been present for many years and remains unchanged.  She denies any rhinorrhea or sore throat.  She denies any fevers chills or night sweats.  She does report.  20 pound weight loss in the last 6 months.  She states she hardly eats because she does not feel like cooking and only eats candy.  When asked if she has a loss of appetite she states no because she will eat if other people cook for her.  When I asked her if this is a new development for her she states she has been like this all her life.  She states she started using a protein supplement in the last few weeks and  has gained back 5 pounds.  She denies any abdominal pain nausea vomiting or diarrhea.  No rashes or skin lesions.    She is a retired .  She retired in 1994.  She believes she has been tested for tuberculosis through her work and has always been negative.  She denies any healthcare or homeless shelter exposure.  She denies any known TB exposures.  She has lived in [place] to the age of 6.  Has lived in [facility] in [place].  Denies any travel outside of the  except [place].  Denies any exotic animal exposures.  No exposures to birds or chickens.  Continues to smoke at least 1 pack/day since age of 16.    Past Medical History:   Diagnosis Date   • Chronic back pain    • Disease of thyroid gland    • Hypercholesterolemia    • Marijuana smoker     3X'S /DAY   • PONV (postoperative nausea and vomiting)    • Seasonal allergies        Past Surgical History:   Procedure Laterality Date   • APPENDECTOMY     • CERVICAL FUSION  1998    C3   • COLONOSCOPY N/A 5/11/2018    Procedure: COLONOSCOPY;  Surgeon: Richard Bae MD;  [facility];  Service: Gastroenterology   • COLONOSCOPY W/ POLYPECTOMY N/A 7/8/2021    Procedure: COLONOSCOPY WITH POLYPECTOMY;  Surgeon: Richard Bae MD;  [facility];  Service: Gastroenterology;  Laterality: N/A;  internal and external hemorrhoids  diverticulosis  transverse polyp x2  sigmoid polyp   • NECK SURGERY     • RECONSTRUCTION OF NOSE     • SKIN GRAFT      x 30       Social History   reports that she has been smoking cigarettes. She has been smoking about 1.00 pack per day. She has never used smokeless tobacco. She reports current drug use. Drug: Marijuana. She reports that she does not drink alcohol.    Family History  She reports a family history of autoimmune disease    Allergies   Allergen Reactions   • Ampicillin Rash       The medication list has been reviewed and updated.     Review of Systems  Pertinent items are noted  in HPI, all other systems reviewed and negative    Vital Signs   Vitals:    02/28/22 1015   BP: 111/72   Pulse: 62   Resp: 18   Temp: 98.1 °F (36.7 °C)       Physical Exam:   General: In no acute distress  HEENT: Normocephalic, atraumatic, no scleral icterus.   Neck: Supple, trachea is midline  Cardiovascular: Normal rate, regular rhythm, juan j S1 and S2, no murmurs, rubs, or gallops    Respiratory: Lungs are clear to ascultation bilaterally, no wheezing   GI: Abdomen is soft, non-tender, non-distended, positive bowel sounds bilaterally  Musculoskeletal:no edema, tenderness or deformity  Skin: No rashes lesions  LE: no E/C/C  Neurological: Alert and oriented, moving all 4 extremities  Psychiatric: Normal mood and affect     12/30/21 CHest CT personally reviewed by me shows interval progression of solid and cavitary masses some of which have spiculated features. Features now favor multifocal malignancy over an inflammatory or infectious process. Squamous  cell carcinoma is a differential consideration. Enlarging AP window lymph node.  emphysematous changes.    1/26 PET scan Bilateral cavitary lung nodules most numerous in the upper lung zones with mild to moderate height per metabolism.  Most nodules are unchanged in size as compared to December 30 at the left lung apex nodule has increased in size.    8/2020 CT of the chest shows 2 tiny peripheral pulmonary nodules in the left lower lobe unchanged as compared to January 2019.  Ill-defined nonsolid pulmonary nodule in the right lower lobe.  Moderately severe diffuse pulmonary emphysema    3/2021 CT of the chest shows new 3 mm nodule in the left lung.  Subtle micronodules and subtle reticular nodular interstitial process in the right lung.  Stable left lower lobe subpleural nodule.    Assessment:  This is a 66 y.o. female who presents to clinic today for evaluation of multiple cavitary pulmonary nodules that have been present and increasing in size at least since  2019.  Patient does not have any signs or symptoms consistent with infection specifically no fevers or chills.  She has dyspnea on exertion and cough but it is hard to tell whether this is related to her emphysema or not.  Certainly infection and autoimmune diseases are also on the differential.  I discussed with the patient extensively kind of work-up I can do for bacterial fungal and AFB causes for cavitary lung lesions.  I also told her that obtaining a biopsy would be the quickest and easiest way to get out diagnosis.  I also emphasized the fact that none of my studies will rule out cancer which still remains high on the differential.  Patient and her sister voiced understanding and would like to proceed with the work-up.    Plan:   1.  Obtain a urine and serum histo antigen, check histoplasma antibodies  2.  Obtain a blasto urinary antigen, cryptococcus serum antigen, and Aspergillus galactomannan  3.  Obtain coccidiomycosis antibodies  4.  Obtain a serum procalcitonin and Fungitell  5.  Obtain QuantiFERON gold testing  6.  Check an MIGUEL, ANCA, rheumatoid factor    Return to Infectious Disease clinic TBD

## 2022-03-01 LAB
CENTROMERE B AB SER-ACNC: <0.2 AI (ref 0–0.9)
CHROMATIN AB SERPL-ACNC: <0.2 AI (ref 0–0.9)
DSDNA AB SER-ACNC: <1 IU/ML (ref 0–9)
ENA JO1 AB SER-ACNC: <0.2 AI (ref 0–0.9)
ENA RNP AB SER-ACNC: <0.2 AI (ref 0–0.9)
ENA SCL70 AB SER-ACNC: <0.2 AI (ref 0–0.9)
ENA SM AB SER-ACNC: <0.2 AI (ref 0–0.9)
ENA SS-A AB SER-ACNC: <0.2 AI (ref 0–0.9)
ENA SS-B AB SER-ACNC: <0.2 AI (ref 0–0.9)
Lab: NORMAL

## 2022-03-02 LAB
C-ANCA TITR SER IF: ABNORMAL TITER
GAMMA INTERFERON BACKGROUND BLD IA-ACNC: 0.03 IU/ML
M TB IFN-G BLD-IMP: NEGATIVE
M TB IFN-G CD4+ BCKGRND COR BLD-ACNC: 0.02 IU/ML
M TB IFN-G CD4+CD8+ BCKGRND COR BLD-ACNC: 0.04 IU/ML
MITOGEN IGNF BLD-ACNC: >10 IU/ML
P-ANCA ATYPICAL TITR SER IF: ABNORMAL TITER
P-ANCA TITR SER IF: ABNORMAL TITER
QUANTIFERON INCUBATION: NORMAL
SERVICE CMNT-IMP: NORMAL

## 2022-03-03 LAB
H CAPSUL AG SPEC QL: NORMAL
REF LAB TEST METHOD: NORMAL

## 2022-03-04 ENCOUNTER — PATIENT ROUNDING (BHMG ONLY) (OUTPATIENT)
Dept: INFECTIOUS DISEASES | Facility: CLINIC | Age: 67
End: 2022-03-04

## 2022-03-04 LAB
GALACTOMANNAN AG SPEC IA-ACNC: 0.03 INDEX (ref 0–0.49)
H CAPSUL AB TITR SER ID: NEGATIVE {TITER}

## 2022-03-04 NOTE — PROGRESS NOTES
March 4, 2022      I am  with [facility]  [address] [address] [phone].         We're always looking for ways to make our patients' experiences even better. Do you have recommendations on ways we may improve?  no    Overall were you satisfied with your first visit to our practice? yes       I appreciate you taking the time to speak with me today. Is there anything else I can do for you? no      Thank you, and have a great day.

## 2022-03-05 LAB
C IMMITIS IGG SER QL IA: 0.2 EIA UNITS
C IMMITIS IGM SER QL IA: 0.2 EIA UNITS

## 2022-03-07 LAB — SPECIMEN STATUS: NORMAL

## 2022-04-07 ENCOUNTER — TRANSCRIBE ORDERS (OUTPATIENT)
Dept: GENERAL RADIOLOGY | Facility: HOSPITAL | Age: 67
End: 2022-04-07

## 2022-04-07 DIAGNOSIS — Z20.822 ENCOUNTER FOR PREPROCEDURE SCREENING LABORATORY TESTING FOR COVID-19: ICD-10-CM

## 2022-04-07 DIAGNOSIS — Z01.812 ENCOUNTER FOR PREPROCEDURE SCREENING LABORATORY TESTING FOR COVID-19: ICD-10-CM

## 2022-04-07 DIAGNOSIS — R91.1 NODULE OF LEFT LUNG: Primary | ICD-10-CM

## 2022-04-07 NOTE — PROGRESS NOTES
04/21/22 0001   Pre-Procedure Phone Call   Procedure Time Verified Yes   Arrival Time 0830   Procedure Location Verified Yes   Medical History Reviewed No   NPO Status Reinforced Yes   Ride and Caregiver Arranged Yes   Patient Knows to Bring Current Medications No   Bring Outside Films Requested No

## 2022-04-14 ENCOUNTER — TELEPHONE (OUTPATIENT)
Dept: INFECTIOUS DISEASES | Facility: CLINIC | Age: 67
End: 2022-04-14

## 2022-04-14 NOTE — TELEPHONE ENCOUNTER
Phone with patient (see attached messages). I called to inform her Dr. Lawson would like her to call Dr. Wallace's office since they scheduled the biopsy and see when/if she needs follow up with them after the procedure. Patient thanked me for calling her and letting her know to do this. Wished her well with the biopsy and advised call our office if any further ID questions or concerns arise

## 2022-04-14 NOTE — TELEPHONE ENCOUNTER
----- Message from Liv Lawson MD sent at 4/14/2022  1:56 PM EDT -----  It looks like Dr. Wallace ordered the biopsy she can call his office and find out how he would like to follow-up with her.    ----- Message -----  From: Lisa Landin RN  Sent: 4/14/2022  12:55 PM EDT  To: Liv Lawson MD    That is scheduled but she wants to know who to follow up with after Biopsy?  ----- Message -----  From: Liv Lawson MD  Sent: 4/14/2022  12:48 PM EDT  To: Lisa Landin RN    I talked to her before my vacation during spring break and also contacted Dr. Archibald and Madison at that time. Obviously, they already got everything set up. Sorry for the confusion she should proceed with the biopsy as scheduled.  Thanks      ----- Message -----  From: Lisa Landin RN  Sent: 4/14/2022  12:06 PM EDT  To: Liv Lawson MD    I called patient. She states she was very confused about my call to her. She is already scheduled for Biopsy on Wednesday, 4/20/22 and she does NOT have follow up with any doctors to her knowledge after the biopsy. She is concerned that everyone is not on the same page. Please advise if she is to move forward with this biopsy and also if she needs to schedule follow up with Dr. Archibald or Madison? MADDIE RN    ----- Message -----  From: Liv Lawson MD  Sent: 4/14/2022  11:50 AM EDT  To: Lisa Landin RN    Can you please let her know I communicated with Dr. Wallace and Dr. Archibald.  The patient was aware that my plan was to discuss her results with these 2 physicians.  Please let her know that Dr. Archibald is still recommending biopsy and is willing to see her in clinic again to discuss.  Let me know what she says and I can reach out back to him in his office to schedule her for a follow-up visit.  Thanks    The more to offer her from an infectious disease standpoint.  ----- Message -----  From: Adam Archibald III, MD  Sent: 4/1/2022   4:12 PM EDT  To: Liv Lawson MD    pANCA can be elevated in diseases like  Wegener's granulomatosis which could present with multiple cavitary nodules.  Really would need a biopsy preferably wedge resection of one of the nodules to help establish the diagnosis and look for vasculitis in the specimen.  Last I talked with the patient she was not interested in having a biopsy and if it was cancer she was not going to pursue treatment.  I will be glad to see her again and talk with her again if you think it would help.  ----- Message -----  From: Liv Lawson MD  Sent: 3/31/2022  10:46 AM EDT  To: Geronimo Wallace MD, Adam Archibald III, MD    Infectious work up came back negative. 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 Thoughts on the significance of that results? Thanks    Liv Lawson

## 2022-04-19 ENCOUNTER — LAB (OUTPATIENT)
Dept: LAB | Facility: HOSPITAL | Age: 67
End: 2022-04-19

## 2022-04-19 DIAGNOSIS — Z20.822 ENCOUNTER FOR PREPROCEDURE SCREENING LABORATORY TESTING FOR COVID-19: ICD-10-CM

## 2022-04-19 DIAGNOSIS — Z01.812 ENCOUNTER FOR PREPROCEDURE SCREENING LABORATORY TESTING FOR COVID-19: ICD-10-CM

## 2022-04-19 LAB — SARS-COV-2 RNA PNL SPEC NAA+PROBE: NOT DETECTED

## 2022-04-19 PROCEDURE — 87635 SARS-COV-2 COVID-19 AMP PRB: CPT | Performed by: INTERNAL MEDICINE

## 2022-04-19 PROCEDURE — C9803 HOPD COVID-19 SPEC COLLECT: HCPCS

## 2022-04-20 ENCOUNTER — HOSPITAL ENCOUNTER (OUTPATIENT)
Dept: CT IMAGING | Facility: HOSPITAL | Age: 67
Discharge: HOME | End: 2022-04-20

## 2022-04-20 ENCOUNTER — HOSPITAL ENCOUNTER (OUTPATIENT)
Dept: GENERAL RADIOLOGY | Facility: HOSPITAL | Age: 67
Discharge: HOME | End: 2022-04-20

## 2022-04-20 VITALS
DIASTOLIC BLOOD PRESSURE: 63 MMHG | RESPIRATION RATE: 16 BRPM | OXYGEN SATURATION: 97 % | BODY MASS INDEX: 20.89 KG/M2 | WEIGHT: 130 LBS | HEIGHT: 66 IN | SYSTOLIC BLOOD PRESSURE: 131 MMHG | HEART RATE: 68 BPM

## 2022-04-20 DIAGNOSIS — R91.1 NODULE OF LEFT LUNG: ICD-10-CM

## 2022-04-20 LAB
INR PPP: 1 (ref 0.8–1.2)
PROTHROMBIN TIME: 11.7 SECONDS (ref 12.8–15.2)

## 2022-04-20 PROCEDURE — 88342 IMHCHEM/IMCYTCHM 1ST ANTB: CPT | Performed by: INTERNAL MEDICINE

## 2022-04-20 PROCEDURE — 0 LIDOCAINE 1 % SOLUTION: Performed by: RADIOLOGY

## 2022-04-20 PROCEDURE — 71045 X-RAY EXAM CHEST 1 VIEW: CPT

## 2022-04-20 PROCEDURE — 88312 SPECIAL STAINS GROUP 1: CPT | Performed by: INTERNAL MEDICINE

## 2022-04-20 PROCEDURE — 25010000002 MIDAZOLAM PER 1 MG: Performed by: RADIOLOGY

## 2022-04-20 PROCEDURE — 25010000002 FENTANYL CITRATE (PF) 50 MCG/ML SOLUTION: Performed by: RADIOLOGY

## 2022-04-20 PROCEDURE — 85610 PROTHROMBIN TIME: CPT

## 2022-04-20 PROCEDURE — 88305 TISSUE EXAM BY PATHOLOGIST: CPT | Performed by: INTERNAL MEDICINE

## 2022-04-20 RX ORDER — FENTANYL CITRATE 50 UG/ML
INJECTION, SOLUTION INTRAMUSCULAR; INTRAVENOUS
Status: COMPLETED | OUTPATIENT
Start: 2022-04-20 | End: 2022-04-20

## 2022-04-20 RX ORDER — LIDOCAINE HYDROCHLORIDE 10 MG/ML
20 INJECTION, SOLUTION INFILTRATION; PERINEURAL ONCE
Status: COMPLETED | OUTPATIENT
Start: 2022-04-20 | End: 2022-04-20

## 2022-04-20 RX ORDER — MIDAZOLAM HYDROCHLORIDE 1 MG/ML
INJECTION INTRAMUSCULAR; INTRAVENOUS
Status: COMPLETED | OUTPATIENT
Start: 2022-04-20 | End: 2022-04-20

## 2022-04-20 RX ORDER — SODIUM CHLORIDE 0.9 % (FLUSH) 0.9 %
10 SYRINGE (ML) INJECTION AS NEEDED
Status: DISCONTINUED | OUTPATIENT
Start: 2022-04-20 | End: 2022-04-21 | Stop reason: HOSPADM

## 2022-04-20 RX ORDER — SODIUM CHLORIDE 0.9 % (FLUSH) 0.9 %
3 SYRINGE (ML) INJECTION EVERY 12 HOURS SCHEDULED
Status: DISCONTINUED | OUTPATIENT
Start: 2022-04-20 | End: 2022-04-21 | Stop reason: HOSPADM

## 2022-04-20 RX ORDER — SODIUM CHLORIDE 9 MG/ML
25 INJECTION, SOLUTION INTRAVENOUS ONCE
Status: DISCONTINUED | OUTPATIENT
Start: 2022-04-20 | End: 2022-04-21 | Stop reason: HOSPADM

## 2022-04-20 RX ADMIN — MIDAZOLAM 1 MG: 1 INJECTION INTRAMUSCULAR; INTRAVENOUS at 09:23

## 2022-04-20 RX ADMIN — LIDOCAINE HYDROCHLORIDE 20 ML: 10 INJECTION, SOLUTION INFILTRATION; PERINEURAL at 09:26

## 2022-04-20 RX ADMIN — FENTANYL CITRATE 50 MCG: 0.05 INJECTION, SOLUTION INTRAMUSCULAR; INTRAVENOUS at 09:23

## 2022-04-20 NOTE — NURSING NOTE
Patient arrived to Rad. Triage for CT guided lung biopsy. Pt wearing mask, RN wearing mask for all patient encounters.

## 2022-04-20 NOTE — NURSING NOTE
Dr. Beatty called and stated patient first post biopsy chest x-ray is negative for pneumothorax or hemorrhage.

## 2022-04-20 NOTE — NURSING NOTE
Dr. Beatty called and states patients second post biopsy x-ray was negative for pneumothorax or hemorrhage, may d/c home.

## 2022-04-20 NOTE — H&P
Name: Rosita Houston ADMIT: 2022   : 1955  PCP: Akhil Dillon MD    MRN: [id number] LOS: 0 days   AGE/SEX: 66 y.o. female  ROOM: Room/bed info not found       Chief complaint   Patient is a 66 y.o. female presents with lung nodule.     Past Surgical History:  Past Surgical History:   Procedure Laterality Date   • APPENDECTOMY     • CERVICAL FUSION      C3   • COLONOSCOPY N/A 2018    Procedure: COLONOSCOPY;  Surgeon: Richard Bae MD;  Location: [facility];  Service: Gastroenterology   • COLONOSCOPY W/ POLYPECTOMY N/A 2021    Procedure: COLONOSCOPY WITH POLYPECTOMY;  Surgeon: Richard Bae MD;  [facility];  Service: Gastroenterology;  Laterality: N/A;  internal and external hemorrhoids  diverticulosis  transverse polyp x2  sigmoid polyp   • NECK SURGERY     • RECONSTRUCTION OF NOSE     • SKIN GRAFT      x 30       Past Medical History:  Past Medical History:   Diagnosis Date   • Chronic back pain    • Disease of thyroid gland    • Hypercholesterolemia    • Marijuana smoker     3X'S /DAY   • PONV (postoperative nausea and vomiting)    • Seasonal allergies        Home Medications:  (Not in a hospital admission)      Allergies:  Ampicillin    Family History:  Family History   Problem Relation Age of Onset   • Breast cancer Neg Hx        Social History:  Social History     Tobacco Use   • Smoking status: Current Every Day Smoker     Packs/day: 1.00     Types: Cigarettes   • Smokeless tobacco: Never Used   • Tobacco comment: pt smoked on way to procedure   Substance Use Topics   • Alcohol use: No   • Drug use: Yes     Types: Marijuana        Objective     Physical Exam:   R/r/r, ctab    Vital Signs  Heart Rate:  [61] 61  Resp:  [18] 18  BP: (105)/(79) 105/79    Anticipated Surgical Procedure:  Ct lung biopsy    The risks, benefits and alternatives of this procedure have been discussed with the patient or responsible party: Yes        Raul Medina  MD [place]  04/20/22  08:49 EDT

## 2022-04-20 NOTE — DISCHARGE INSTRUCTIONS
EDUCATION /DISCHARGE INSTRUCTIONS  CT/US guided biopsy:  A biopsy is a procedure done to remove tissue for further analysis.  Before images are taken to locate the target area.  Images can be obtained using ultrasound, CT or MRI.  A physician will clean your skin with antiseptic soap, place a sterile towel around the site and administer a local anesthetic to numb the area.  The physician will then insert a special needle.  Sometimes images are taken of the needle after it is inserted to ensure the needle is in the correct area to be biopsied.   A sample is obtained and sent to the laboratory for study.  Occasionally the laboratory is unable to make a diagnosis from the sample and the procedure may need to be repeated.  Within a week the radiologist will send a report to your physician.  A pathologist will also examine the tissue and send a report.    Risks of the procedure include but are not limited to:   *  Bleeding    *  Infection   *  Puncture of surrounding organs *  Death     *  Lung collapse if the biopsy is near the chest which may require insertion of a      chest tube to re-inflate the lung if severe.    Benefits of the procedure:  Using x-ray helps to locate the area that requires a biopsy. The procedure is less invasive than a surgical procedure, there are no large incisions and it does not require anesthesia.    Alternatives to the procedure:  A biopsy can be performed surgically.  Risks of a surgical biopsy include exposure to anesthesia, infection, excessive bleeding and injury to abdominal organs.  A benefit of surgical biopsy is the ability to see the area to be biopsied and remove of a larger piece of tissue.    THIS EDUCATION INFORMATION WAS REVIEWED PRIOR TO PROCEDURE AND CONSENT. Patient initials__________________Time___________________    Post Procedure:    *  Expect the biopsy site may be tender up to one week.    *  Rest today (no pushing pulling or straining).   *  Slowly increase activity  tomorrow.    *  If you received sedation do not drive for 24 hours.   *  Keep dressing clean and dry.   *  Leave dressing on puncture site for 24 hours.    *  You may shower when dressing removed.  Call your doctor if experiencing:   *  Signs of infection such as redness, swelling, excessive pain and / or foul        smelling drainage from the puncture site.   *  Chills or fever over 101 degrees (by mouth).   *  Unrelieved pain.   *  Any new or severe symptoms.   *  If experiencing sudden / severe shortness of breath or chest pain go to the       nearest emergency room.   Following the procedure:     Follow-up with the ordering physician as directed.    Continue to take other medications as directed by your physician unless    otherwise instructed.   If applicable, resume taking your blood thinners or Aspirin on ___________.    If you have any concerns please call the Radiology Nurses Desk at [phone].  You are the most important factor in your recovery.  Follow the above instructions carefully.   Moderate Sedation  Sedation is the use of medicines to promote relaxation and relieve discomfort and anxiety during your procedure. Moderate sedation is a type of mild sedation, it is not anesthesia. When receiving moderate sedation you are less alert than normal, but you are still able to respond to instructions, touch, or both.    What are the risks?  Generally, this is a safe procedure. However, problems may occur, including but not limited to:  Getting too much medicine (over sedation).  Nausea or vomiting  Allergic reaction to medicines.  Trouble breathing. If this happens, a breathing tube may be used to help with breathing. It will be removed when you are awake and breathing on your own.    What happens prior to the procedure?  An IV catheter will be inserted into one of your veins.  Your nurse will do a full work up including reviewing your medications, allergies, medical history, who is taking your home and  other pertinent information.  Medicine to help you relax will be given through the IV tubing.  The registered nurse and physician will monitor you closely during the entire procedure.    What happens after the procedure?  Your blood pressure, heart rate, breathing rate, and blood oxygen level will be monitored often until the medicines you were given have worn off.  Do not drive for 24 hours.  If you are an inpatient, you will return to your room where your nurse will monitor you appropriately.        I read, or had read to me, this education sheet.        __________________________________________________________      ______________________________________  Patient/Person authorized to sign for patient                                                    Date/Time      Discharge Instructions after receiving Moderate Sedation  These instructions provide you with information about caring for yourself after your procedure. Your health care provider may also give you more specific instructions. Your treatment has been planned according to current medical practices, but problems sometimes occur. Call your physican or the Radiology Nurses ([phone]) if you have any problems or questions after your procedure.    What can I expect after the procedure?  After your procedure, you may:  Feel sleepy or light headed for several hours.  Feel clumsy, dizzy or have poor balance for several hours.  Feel forgetful about what happened after the procedure.  Have poor judgment for several hours.  Feel nauseous or vomit.    For at least 24 hours after the procedure:  Have a responsible adult stay with you or frequently check on you until you are awake and alert.   Rest as needed.    Do not:  Participate in activities in which you could fall or become injured.  Drive or operate heavy machinery  Take sleeping pills or medicines that cause drowsiness.  Make important decisions or sign legal documents.  Take care of children on your  own.    Eating and drinking: Clear liquids then progress slowly to a normal diet.  If you vomit, drink water, juice, or soup when you can drink without vomiting.  Make sure you have little or no nausea before eating solid foods.    General instructions: Take over-the-counter and prescription medicines only as told by your health care provider .If you have sleep apnea, surgery and certain medicines can increase your risk for breathing problems. Follow instructions from your health care provider about wearing your sleep device: If you smoke, do not smoke without supervision.  Keep all follow-up visits as told by your health care provider. This is important.    Contact your physician if:  You continue to keep feeling nauseous or you keep vomiting. You continue to feel light-headed, develop a fever or a rash.  Get help right away if you have trouble breathing    I acknowledge the above instructions:    Patient/ Responsible person _________________________________Date ____________Time ____________    Witnessed by____________________________________________ Date_____________ Time____________       ___________________________________________________________     ______________________________________       Witness signature                                                                                              Date/Time

## 2022-04-21 ENCOUNTER — HOSPITAL ENCOUNTER (OUTPATIENT)
Dept: CT IMAGING | Facility: HOSPITAL | Age: 67
Discharge: HOME | End: 2022-04-21

## 2022-04-21 LAB
LAB AP CASE REPORT: NORMAL
LAB AP CLINICAL INFORMATION: NORMAL
LAB AP DIAGNOSIS COMMENT: NORMAL
PATH REPORT.FINAL DX SPEC: NORMAL
PATH REPORT.GROSS SPEC: NORMAL

## 2022-06-06 ENCOUNTER — HOSPITAL ENCOUNTER (OUTPATIENT)
Dept: GENERAL RADIOLOGY | Facility: HOSPITAL | Age: 67
Discharge: HOME | End: 2022-06-06

## 2022-06-06 ENCOUNTER — TRANSCRIBE ORDERS (OUTPATIENT)
Dept: ADMINISTRATIVE | Facility: HOSPITAL | Age: 67
End: 2022-06-06

## 2022-06-06 DIAGNOSIS — M81.0 SENILE OSTEOPOROSIS: Primary | ICD-10-CM

## 2022-06-06 DIAGNOSIS — I82.90 VENOUS THROMBOSIS AFTER IMMOBILITY: ICD-10-CM

## 2022-06-06 DIAGNOSIS — M25.552 PAIN OF BOTH HIP JOINTS: ICD-10-CM

## 2022-06-06 DIAGNOSIS — Z74.09 VENOUS THROMBOSIS AFTER IMMOBILITY: ICD-10-CM

## 2022-06-06 DIAGNOSIS — M47.10 OSTEOARTHRITIS OF SPINE WITH MYELOPATHY, UNSPECIFIED SPINAL REGION: ICD-10-CM

## 2022-06-06 DIAGNOSIS — M81.0 SENILE OSTEOPOROSIS: ICD-10-CM

## 2022-06-06 DIAGNOSIS — M25.551 PAIN OF BOTH HIP JOINTS: ICD-10-CM

## 2022-06-06 PROCEDURE — 72072 X-RAY EXAM THORAC SPINE 3VWS: CPT

## 2022-06-06 PROCEDURE — 72040 X-RAY EXAM NECK SPINE 2-3 VW: CPT

## 2022-06-06 PROCEDURE — 72100 X-RAY EXAM L-S SPINE 2/3 VWS: CPT

## 2022-06-06 PROCEDURE — 73522 X-RAY EXAM HIPS BI 3-4 VIEWS: CPT

## 2022-10-31 ENCOUNTER — TRANSCRIBE ORDERS (OUTPATIENT)
Dept: ADMINISTRATIVE | Facility: HOSPITAL | Age: 67
End: 2022-10-31

## 2022-10-31 DIAGNOSIS — Z12.31 SCREENING MAMMOGRAM FOR HIGH-RISK PATIENT: Primary | ICD-10-CM

## 2022-11-14 ENCOUNTER — TRANSCRIBE ORDERS (OUTPATIENT)
Dept: ADMINISTRATIVE | Facility: HOSPITAL | Age: 67
End: 2022-11-14

## 2022-11-14 DIAGNOSIS — M51.36 DDD (DEGENERATIVE DISC DISEASE), LUMBAR: Primary | ICD-10-CM

## 2022-11-22 ENCOUNTER — HOSPITAL ENCOUNTER (OUTPATIENT)
Dept: MAMMOGRAPHY | Facility: HOSPITAL | Age: 67
Discharge: HOME | End: 2022-11-22
Admitting: NURSE PRACTITIONER

## 2022-11-22 DIAGNOSIS — Z12.31 SCREENING MAMMOGRAM FOR HIGH-RISK PATIENT: ICD-10-CM

## 2022-11-22 PROCEDURE — 77067 SCR MAMMO BI INCL CAD: CPT

## 2022-11-22 PROCEDURE — 77063 BREAST TOMOSYNTHESIS BI: CPT

## 2022-12-02 ENCOUNTER — HOSPITAL ENCOUNTER (OUTPATIENT)
Dept: MRI IMAGING | Facility: HOSPITAL | Age: 67
End: 2022-12-02

## 2022-12-07 ENCOUNTER — HOSPITAL ENCOUNTER (OUTPATIENT)
Dept: MRI IMAGING | Facility: HOSPITAL | Age: 67
Discharge: HOME | End: 2022-12-07
Admitting: PEDIATRICS

## 2022-12-07 DIAGNOSIS — M51.36 DDD (DEGENERATIVE DISC DISEASE), LUMBAR: ICD-10-CM

## 2022-12-07 PROCEDURE — 72148 MRI LUMBAR SPINE W/O DYE: CPT

## 2023-11-01 ENCOUNTER — TRANSCRIBE ORDERS (OUTPATIENT)
Dept: ADMINISTRATIVE | Facility: HOSPITAL | Age: 68
End: 2023-11-01
Payer: MEDICARE

## 2023-11-01 DIAGNOSIS — F17.210 CIGARETTE SMOKER: Primary | ICD-10-CM

## 2023-11-27 ENCOUNTER — TRANSCRIBE ORDERS (OUTPATIENT)
Dept: ADMINISTRATIVE | Facility: HOSPITAL | Age: 68
End: 2023-11-27
Payer: MEDICARE

## 2023-11-27 DIAGNOSIS — M25.551 RIGHT HIP PAIN: Primary | ICD-10-CM

## 2023-12-06 ENCOUNTER — HOSPITAL ENCOUNTER (OUTPATIENT)
Dept: CT IMAGING | Facility: HOSPITAL | Age: 68
Discharge: HOME | End: 2023-12-06
Admitting: PHYSICIAN ASSISTANT
Payer: MEDICARE

## 2023-12-06 DIAGNOSIS — M25.551 RIGHT HIP PAIN: ICD-10-CM

## 2023-12-06 PROCEDURE — 73700 CT LOWER EXTREMITY W/O DYE: CPT

## 2024-07-21 ENCOUNTER — TRANSCRIBE ORDERS (OUTPATIENT)
Dept: ADMINISTRATIVE | Facility: HOSPITAL | Age: 69
End: 2024-07-21
Payer: MEDICARE

## 2024-07-21 DIAGNOSIS — Z12.31 BREAST CANCER SCREENING BY MAMMOGRAM: Primary | ICD-10-CM

## 2024-08-07 ENCOUNTER — TRANSCRIBE ORDERS (OUTPATIENT)
Dept: ADMINISTRATIVE | Facility: HOSPITAL | Age: 69
End: 2024-08-07
Payer: MEDICARE

## 2024-08-07 DIAGNOSIS — Z13.820 SCREENING FOR OSTEOPOROSIS: Primary | ICD-10-CM

## 2024-10-21 ENCOUNTER — HOSPITAL ENCOUNTER (OUTPATIENT)
Dept: MAMMOGRAPHY | Facility: HOSPITAL | Age: 69
Discharge: HOME | End: 2024-10-21
Payer: MEDICARE

## 2024-10-21 ENCOUNTER — HOSPITAL ENCOUNTER (OUTPATIENT)
Dept: CT IMAGING | Facility: HOSPITAL | Age: 69
Discharge: HOME | End: 2024-10-21
Payer: MEDICARE

## 2024-10-21 ENCOUNTER — APPOINTMENT (OUTPATIENT)
Dept: BONE DENSITY | Facility: HOSPITAL | Age: 69
End: 2024-10-21
Payer: MEDICARE

## 2024-10-21 DIAGNOSIS — Z13.820 SCREENING FOR OSTEOPOROSIS: ICD-10-CM

## 2024-10-21 DIAGNOSIS — Z12.31 BREAST CANCER SCREENING BY MAMMOGRAM: ICD-10-CM

## 2024-10-21 DIAGNOSIS — F17.210 CIGARETTE SMOKER: ICD-10-CM

## 2024-10-21 PROCEDURE — 77063 BREAST TOMOSYNTHESIS BI: CPT | Performed by: RADIOLOGY

## 2024-10-21 PROCEDURE — 77067 SCR MAMMO BI INCL CAD: CPT | Performed by: RADIOLOGY

## 2024-10-21 PROCEDURE — 77080 DXA BONE DENSITY AXIAL: CPT

## 2024-10-21 PROCEDURE — 77063 BREAST TOMOSYNTHESIS BI: CPT

## 2024-10-21 PROCEDURE — 77067 SCR MAMMO BI INCL CAD: CPT

## 2024-10-21 PROCEDURE — 71271 CT THORAX LUNG CANCER SCR C-: CPT

## 2024-11-12 ENCOUNTER — HOSPITAL ENCOUNTER (OUTPATIENT)
Facility: HOSPITAL | Age: 69
Setting detail: HOSPITAL OUTPATIENT SURGERY
Discharge: HOME | End: 2024-11-12
Attending: INTERNAL MEDICINE | Admitting: INTERNAL MEDICINE
Payer: MEDICARE

## 2024-11-12 ENCOUNTER — ANESTHESIA (OUTPATIENT)
Dept: GASTROENTEROLOGY | Facility: HOSPITAL | Age: 69
End: 2024-11-12
Payer: MEDICARE

## 2024-11-12 ENCOUNTER — ANESTHESIA EVENT (OUTPATIENT)
Dept: GASTROENTEROLOGY | Facility: HOSPITAL | Age: 69
End: 2024-11-12
Payer: MEDICARE

## 2024-11-12 VITALS
OXYGEN SATURATION: 98 % | RESPIRATION RATE: 18 BRPM | BODY MASS INDEX: 22.82 KG/M2 | SYSTOLIC BLOOD PRESSURE: 90 MMHG | HEART RATE: 74 BPM | HEIGHT: 66 IN | DIASTOLIC BLOOD PRESSURE: 58 MMHG | WEIGHT: 142 LBS

## 2024-11-12 DIAGNOSIS — R91.1 LUNG NODULE: ICD-10-CM

## 2024-11-12 LAB
EOSINOPHIL SPEC QL MICRO: 0 % EOS/100 CELLS (ref 0–0)
EOSINOPHIL SPEC QL MICRO: 1 % EOS/100 CELLS (ref 0–0)
GIE STN SPEC: NORMAL
GIE STN SPEC: NORMAL

## 2024-11-12 PROCEDURE — 87206 SMEAR FLUORESCENT/ACID STAI: CPT | Performed by: INTERNAL MEDICINE

## 2024-11-12 PROCEDURE — 88305 TISSUE EXAM BY PATHOLOGIST: CPT | Performed by: INTERNAL MEDICINE

## 2024-11-12 PROCEDURE — 88112 CYTOPATH CELL ENHANCE TECH: CPT | Performed by: INTERNAL MEDICINE

## 2024-11-12 PROCEDURE — 25010000002 LIDOCAINE 2% SOLUTION: Performed by: NURSE ANESTHETIST, CERTIFIED REGISTERED

## 2024-11-12 PROCEDURE — 25010000002 LIDOCAINE PF 1% 1 % SOLUTION: Performed by: INTERNAL MEDICINE

## 2024-11-12 PROCEDURE — 25010000002 ONDANSETRON PER 1 MG: Performed by: NURSE ANESTHETIST, CERTIFIED REGISTERED

## 2024-11-12 PROCEDURE — 87116 MYCOBACTERIA CULTURE: CPT | Performed by: INTERNAL MEDICINE

## 2024-11-12 PROCEDURE — 25010000002 PROPOFOL 10 MG/ML EMULSION: Performed by: NURSE ANESTHETIST, CERTIFIED REGISTERED

## 2024-11-12 PROCEDURE — 87071 CULTURE AEROBIC QUANT OTHER: CPT | Performed by: INTERNAL MEDICINE

## 2024-11-12 PROCEDURE — 87305 ASPERGILLUS AG IA: CPT | Performed by: INTERNAL MEDICINE

## 2024-11-12 PROCEDURE — 87385 HISTOPLASMA CAPSUL AG IA: CPT | Performed by: INTERNAL MEDICINE

## 2024-11-12 PROCEDURE — 25810000003 LACTATED RINGERS PER 1000 ML: Performed by: INTERNAL MEDICINE

## 2024-11-12 PROCEDURE — 25010000002 DEXAMETHASONE SODIUM PHOSPHATE 20 MG/5ML SOLUTION: Performed by: NURSE ANESTHETIST, CERTIFIED REGISTERED

## 2024-11-12 PROCEDURE — 87205 SMEAR GRAM STAIN: CPT | Performed by: INTERNAL MEDICINE

## 2024-11-12 PROCEDURE — 87102 FUNGUS ISOLATION CULTURE: CPT | Performed by: INTERNAL MEDICINE

## 2024-11-12 PROCEDURE — 88312 SPECIAL STAINS GROUP 1: CPT | Performed by: INTERNAL MEDICINE

## 2024-11-12 RX ORDER — HYDROCODONE BITARTRATE AND ACETAMINOPHEN 7.5; 325 MG/1; MG/1
1 TABLET ORAL EVERY 4 HOURS PRN
Status: DISCONTINUED | OUTPATIENT
Start: 2024-11-12 | End: 2024-11-12 | Stop reason: HOSPADM

## 2024-11-12 RX ORDER — FLUMAZENIL 0.1 MG/ML
0.2 INJECTION INTRAVENOUS AS NEEDED
Status: DISCONTINUED | OUTPATIENT
Start: 2024-11-12 | End: 2024-11-12 | Stop reason: HOSPADM

## 2024-11-12 RX ORDER — LIDOCAINE HYDROCHLORIDE 10 MG/ML
INJECTION, SOLUTION EPIDURAL; INFILTRATION; INTRACAUDAL; PERINEURAL AS NEEDED
Status: DISCONTINUED | OUTPATIENT
Start: 2024-11-12 | End: 2024-11-12 | Stop reason: HOSPADM

## 2024-11-12 RX ORDER — PROMETHAZINE HYDROCHLORIDE 25 MG/1
25 SUPPOSITORY RECTAL ONCE AS NEEDED
Status: DISCONTINUED | OUTPATIENT
Start: 2024-11-12 | End: 2024-11-12 | Stop reason: HOSPADM

## 2024-11-12 RX ORDER — ONDANSETRON 2 MG/ML
INJECTION INTRAMUSCULAR; INTRAVENOUS AS NEEDED
Status: DISCONTINUED | OUTPATIENT
Start: 2024-11-12 | End: 2024-11-12 | Stop reason: SURG

## 2024-11-12 RX ORDER — LABETALOL HYDROCHLORIDE 5 MG/ML
5 INJECTION, SOLUTION INTRAVENOUS
Status: DISCONTINUED | OUTPATIENT
Start: 2024-11-12 | End: 2024-11-12 | Stop reason: HOSPADM

## 2024-11-12 RX ORDER — DEXAMETHASONE SODIUM PHOSPHATE 4 MG/ML
INJECTION, SOLUTION INTRA-ARTICULAR; INTRALESIONAL; INTRAMUSCULAR; INTRAVENOUS; SOFT TISSUE AS NEEDED
Status: DISCONTINUED | OUTPATIENT
Start: 2024-11-12 | End: 2024-11-12 | Stop reason: SURG

## 2024-11-12 RX ORDER — ONDANSETRON 2 MG/ML
4 INJECTION INTRAMUSCULAR; INTRAVENOUS ONCE AS NEEDED
Status: DISCONTINUED | OUTPATIENT
Start: 2024-11-12 | End: 2024-11-12 | Stop reason: HOSPADM

## 2024-11-12 RX ORDER — PROMETHAZINE HYDROCHLORIDE 25 MG/1
25 TABLET ORAL ONCE AS NEEDED
Status: DISCONTINUED | OUTPATIENT
Start: 2024-11-12 | End: 2024-11-12 | Stop reason: HOSPADM

## 2024-11-12 RX ORDER — PROPOFOL 10 MG/ML
VIAL (ML) INTRAVENOUS AS NEEDED
Status: DISCONTINUED | OUTPATIENT
Start: 2024-11-12 | End: 2024-11-12 | Stop reason: SURG

## 2024-11-12 RX ORDER — HYDRALAZINE HYDROCHLORIDE 20 MG/ML
5 INJECTION INTRAMUSCULAR; INTRAVENOUS
Status: DISCONTINUED | OUTPATIENT
Start: 2024-11-12 | End: 2024-11-12 | Stop reason: HOSPADM

## 2024-11-12 RX ORDER — IPRATROPIUM BROMIDE AND ALBUTEROL SULFATE 2.5; .5 MG/3ML; MG/3ML
3 SOLUTION RESPIRATORY (INHALATION) ONCE AS NEEDED
Status: DISCONTINUED | OUTPATIENT
Start: 2024-11-12 | End: 2024-11-12 | Stop reason: HOSPADM

## 2024-11-12 RX ORDER — HYDROMORPHONE HYDROCHLORIDE 1 MG/ML
0.25 INJECTION, SOLUTION INTRAMUSCULAR; INTRAVENOUS; SUBCUTANEOUS
Status: DISCONTINUED | OUTPATIENT
Start: 2024-11-12 | End: 2024-11-12 | Stop reason: HOSPADM

## 2024-11-12 RX ORDER — LIDOCAINE HYDROCHLORIDE 20 MG/ML
INJECTION, SOLUTION INFILTRATION; PERINEURAL AS NEEDED
Status: DISCONTINUED | OUTPATIENT
Start: 2024-11-12 | End: 2024-11-12 | Stop reason: SURG

## 2024-11-12 RX ORDER — DIPHENHYDRAMINE HYDROCHLORIDE 50 MG/ML
12.5 INJECTION INTRAMUSCULAR; INTRAVENOUS
Status: DISCONTINUED | OUTPATIENT
Start: 2024-11-12 | End: 2024-11-12 | Stop reason: HOSPADM

## 2024-11-12 RX ORDER — SODIUM CHLORIDE, SODIUM LACTATE, POTASSIUM CHLORIDE, CALCIUM CHLORIDE 600; 310; 30; 20 MG/100ML; MG/100ML; MG/100ML; MG/100ML
30 INJECTION, SOLUTION INTRAVENOUS CONTINUOUS
Status: DISCONTINUED | OUTPATIENT
Start: 2024-11-12 | End: 2024-11-12 | Stop reason: HOSPADM

## 2024-11-12 RX ORDER — EPHEDRINE SULFATE 50 MG/ML
5 INJECTION, SOLUTION INTRAVENOUS ONCE AS NEEDED
Status: DISCONTINUED | OUTPATIENT
Start: 2024-11-12 | End: 2024-11-12 | Stop reason: HOSPADM

## 2024-11-12 RX ORDER — NALOXONE HCL 0.4 MG/ML
0.2 VIAL (ML) INJECTION AS NEEDED
Status: DISCONTINUED | OUTPATIENT
Start: 2024-11-12 | End: 2024-11-12 | Stop reason: HOSPADM

## 2024-11-12 RX ORDER — DROPERIDOL 2.5 MG/ML
0.62 INJECTION, SOLUTION INTRAMUSCULAR; INTRAVENOUS
Status: DISCONTINUED | OUTPATIENT
Start: 2024-11-12 | End: 2024-11-12 | Stop reason: HOSPADM

## 2024-11-12 RX ORDER — FENTANYL CITRATE 50 UG/ML
25 INJECTION, SOLUTION INTRAMUSCULAR; INTRAVENOUS
Status: DISCONTINUED | OUTPATIENT
Start: 2024-11-12 | End: 2024-11-12 | Stop reason: HOSPADM

## 2024-11-12 RX ORDER — HYDROCODONE BITARTRATE AND ACETAMINOPHEN 5; 325 MG/1; MG/1
1 TABLET ORAL ONCE AS NEEDED
Status: DISCONTINUED | OUTPATIENT
Start: 2024-11-12 | End: 2024-11-12 | Stop reason: HOSPADM

## 2024-11-12 RX ADMIN — PROPOFOL 20 MG: 10 INJECTION, EMULSION INTRAVENOUS at 08:16

## 2024-11-12 RX ADMIN — ONDANSETRON 4 MG: 2 INJECTION INTRAMUSCULAR; INTRAVENOUS at 08:05

## 2024-11-12 RX ADMIN — SODIUM CHLORIDE, POTASSIUM CHLORIDE, SODIUM LACTATE AND CALCIUM CHLORIDE 30 ML/HR: 600; 310; 30; 20 INJECTION, SOLUTION INTRAVENOUS at 07:46

## 2024-11-12 RX ADMIN — DEXAMETHASONE SODIUM PHOSPHATE 6 MG: 4 INJECTION, SOLUTION INTRAMUSCULAR; INTRAVENOUS at 08:19

## 2024-11-12 RX ADMIN — PROPOFOL 150 MG: 10 INJECTION, EMULSION INTRAVENOUS at 08:01

## 2024-11-12 RX ADMIN — PROPOFOL 200 MCG/KG/MIN: 10 INJECTION, EMULSION INTRAVENOUS at 08:01

## 2024-11-12 RX ADMIN — LIDOCAINE HYDROCHLORIDE 60 MG: 20 INJECTION, SOLUTION INFILTRATION; PERINEURAL at 08:01

## 2024-11-12 NOTE — ANESTHESIA PREPROCEDURE EVALUATION
Anesthesia Evaluation     history of anesthetic complications:  PONV               Airway   Mallampati: II  TM distance: >3 FB  Neck ROM: full  No difficulty expected  Dental - normal exam     Pulmonary    (-) not a smoker  Cardiovascular     (+) hyperlipidemia      Neuro/Psych  (+) psychiatric history Anxiety  GI/Hepatic/Renal/Endo    (+) thyroid problem     Musculoskeletal     Abdominal    Substance History       Comment: THC 3 times per day   OB/GYN          Other                    Anesthesia Plan    ASA 2     MAC     intravenous induction     Anesthetic plan, risks, benefits, and alternatives have been provided, discussed and informed consent has been obtained with: patient.    CODE STATUS:

## 2024-11-12 NOTE — ANESTHESIA POSTPROCEDURE EVALUATION
Patient: Rosita Houston    Procedure Summary       Date: 11/12/24 Room / Location:  [facility]    Anesthesia Start: 0758 Anesthesia Stop: 0844    Procedure: BRONCHOSCOPY WITH BAL OF RUL AND CHRISTAL (Bronchus) Diagnosis:     Surgeons: Geronimo Wallace MD Provider: Elizabeth Odonnell MD    Anesthesia Type: MAC ASA Status: 2            Anesthesia Type: MAC    Vitals  Vitals Value Taken Time   BP 90/58 11/12/24 0909   Temp     Pulse 91 11/12/24 0913   Resp 18 11/12/24 0909   SpO2 93 % 11/12/24 0913   Vitals shown include unfiled device data.        Post Anesthesia Care and Evaluation    Patient location during evaluation: bedside  Patient participation: complete - patient participated  Level of consciousness: awake  Pain management: adequate    Airway patency: patent  Anesthetic complications: No anesthetic complications    Cardiovascular status: acceptable  Respiratory status: acceptable  Hydration status: acceptable

## 2024-11-14 LAB
BACTERIA SPEC AEROBE CULT: NORMAL
BACTERIA SPEC AEROBE CULT: NORMAL
CYTO UR: NORMAL
GRAM STN SPEC: NORMAL
LAB AP CASE REPORT: NORMAL
LAB AP CLINICAL INFORMATION: NORMAL
PATH REPORT.FINAL DX SPEC: NORMAL
PATH REPORT.GROSS SPEC: NORMAL

## 2024-11-15 LAB
GALACTOMANNAN AG SPEC IA-ACNC: 0.03 INDEX (ref 0–0.49)
GALACTOMANNAN AG SPEC IA-ACNC: 0.03 INDEX (ref 0–0.49)
INTERPRETATION: NEGATIVE
INTERPRETATION: NEGATIVE
RESULT: NORMAL NG/ML
RESULT: NORMAL NG/ML
SPECIMEN SOURCE: NORMAL
SPECIMEN SOURCE: NORMAL

## 2024-11-16 LAB — FUNGUS WND CULT: ABNORMAL

## 2024-11-17 LAB
FUNGUS WND CULT: NORMAL
MYCOBACTERIUM SPEC CULT: NORMAL
MYCOBACTERIUM SPEC CULT: NORMAL
NIGHT BLUE STAIN TISS: NORMAL
NIGHT BLUE STAIN TISS: NORMAL

## 2024-11-26 LAB
FUNGUS WND CULT: NORMAL
MYCOBACTERIUM SPEC CULT: NORMAL
NIGHT BLUE STAIN TISS: NORMAL

## 2024-11-27 ENCOUNTER — TRANSCRIBE ORDERS (OUTPATIENT)
Dept: ADMINISTRATIVE | Facility: HOSPITAL | Age: 69
End: 2024-11-27
Payer: MEDICARE

## 2024-11-27 DIAGNOSIS — R91.8 LUNG NODULES: Primary | ICD-10-CM

## 2024-12-15 LAB — FUNGUS WND CULT: ABNORMAL

## 2024-12-17 LAB
MYCOBACTERIUM SPEC CULT: NORMAL
MYCOBACTERIUM SPEC CULT: NORMAL
NIGHT BLUE STAIN TISS: NORMAL
NIGHT BLUE STAIN TISS: NORMAL

## 2024-12-24 LAB
Lab: ABNORMAL
MYCOBACTERIUM SPEC CULT: ABNORMAL
MYCOBACTERIUM SPEC CULT: NORMAL
NIGHT BLUE STAIN TISS: ABNORMAL
NIGHT BLUE STAIN TISS: NORMAL

## 2024-12-31 LAB
Lab: ABNORMAL
MYCOBACTERIUM SPEC CULT: ABNORMAL
NIGHT BLUE STAIN TISS: ABNORMAL

## 2025-01-14 LAB
Lab: ABNORMAL
MYCOBACTERIUM SPEC CULT: ABNORMAL
NIGHT BLUE STAIN TISS: ABNORMAL

## 2025-05-19 ENCOUNTER — HOSPITAL ENCOUNTER (OUTPATIENT)
Dept: CT IMAGING | Facility: HOSPITAL | Age: 70
Discharge: HOME | End: 2025-05-19
Admitting: INTERNAL MEDICINE
Payer: MEDICARE

## 2025-05-19 DIAGNOSIS — R91.8 LUNG NODULES: ICD-10-CM

## 2025-05-19 PROCEDURE — 71250 CT THORAX DX C-: CPT

## (undated) DEVICE — VITAL SIGNS™ JACKSON-REES CIRCUITS: Brand: VITAL SIGNS™

## (undated) DEVICE — ADAPT SWVL FIBROPTIC BRONCH

## (undated) DEVICE — FRCP BX RADJAW4 NDL 2.8 240CM LG OG BX40

## (undated) DEVICE — BW-412T DISP COMBO CLEANING BRUSH: Brand: SINGLE USE COMBINATION CLEANING BRUSH

## (undated) DEVICE — SINGLE USE BIOPSY VALVE MAJ-210: Brand: SINGLE USE BIOPSY VALVE (STERILE)

## (undated) DEVICE — MSK AIRWY LARYNG LMA PILOT SZ4

## (undated) DEVICE — SPNG GZ WOVN 4X4IN 12PLY 10/BX STRL

## (undated) DEVICE — TUBING, SUCTION, 1/4" X 10', STRAIGHT: Brand: MEDLINE

## (undated) DEVICE — Device

## (undated) DEVICE — TRAP,MUCUS SPECIMEN, 80CC: Brand: MEDLINE

## (undated) DEVICE — VIAL FORMALIN CAP 10P 40ML

## (undated) DEVICE — SUCTION CANISTER, 1000CC,SAFELINER: Brand: DEROYAL

## (undated) DEVICE — MASK,FACE,FLUID RESIST,SHLD,EARLOOP: Brand: MEDLINE

## (undated) DEVICE — GLV SURG SENSICARE PI MIC PF SZ7.5 LF STRL

## (undated) DEVICE — ADAPT CLN BIOGUARD AIR/H2O DISP

## (undated) DEVICE — Device: Brand: DEFENDO AIR/WATER/SUCTION AND BIOPSY VALVE

## (undated) DEVICE — SYR LL 3CC

## (undated) DEVICE — ENDO. PORT CONNECTOR W/VALVE FOR OLYMPUS® SCOPES: Brand: ERBE

## (undated) DEVICE — SENSR O2 OXIMAX FNGR A/ 18IN NONSTR

## (undated) DEVICE — SINGLE USE SUCTION VALVE MAJ-209: Brand: SINGLE USE SUCTION VALVE (STERILE)

## (undated) DEVICE — SUCTION CANISTER, 3000CC,SAFELINER: Brand: DEROYAL

## (undated) DEVICE — JACKT LAB F/R KNIT CUFF/COLR XLG BLU

## (undated) DEVICE — GOWN ISOL W/THUMB UNIV BLU BX/15

## (undated) DEVICE — KT ORCA ORCAPOD DISP STRL

## (undated) DEVICE — GLV SURG NEOLON 2G PF LF 7.5 STRL